# Patient Record
Sex: FEMALE | Race: WHITE | NOT HISPANIC OR LATINO | Employment: OTHER | URBAN - METROPOLITAN AREA
[De-identification: names, ages, dates, MRNs, and addresses within clinical notes are randomized per-mention and may not be internally consistent; named-entity substitution may affect disease eponyms.]

---

## 2018-08-28 PROBLEM — Z86.73 HISTORY OF CVA (CEREBROVASCULAR ACCIDENT): Status: ACTIVE | Noted: 2018-08-28

## 2018-08-28 PROBLEM — M47.816 LUMBAR ARTHROPATHY: Status: ACTIVE | Noted: 2018-08-28

## 2018-08-28 PROBLEM — E11.22 TYPE 2 DIABETES MELLITUS WITH CHRONIC KIDNEY DISEASE, WITHOUT LONG-TERM CURRENT USE OF INSULIN: Status: ACTIVE | Noted: 2018-08-28

## 2018-08-28 PROBLEM — I10 ESSENTIAL HYPERTENSION: Status: ACTIVE | Noted: 2018-08-28

## 2018-08-28 PROBLEM — J44.9 COPD (CHRONIC OBSTRUCTIVE PULMONARY DISEASE): Status: ACTIVE | Noted: 2018-08-28

## 2018-08-28 PROBLEM — Z87.891 HISTORY OF TOBACCO ABUSE: Status: ACTIVE | Noted: 2018-08-28

## 2018-08-29 PROBLEM — E55.9 VITAMIN D DEFICIENCY: Status: ACTIVE | Noted: 2018-08-29

## 2019-01-02 ENCOUNTER — TELEPHONE (OUTPATIENT)
Dept: ADMINISTRATIVE | Facility: HOSPITAL | Age: 69
End: 2019-01-02

## 2019-01-10 PROBLEM — N18.4 STAGE 4 CHRONIC KIDNEY DISEASE: Status: ACTIVE | Noted: 2019-01-10

## 2019-07-09 DIAGNOSIS — N18.6 ESRD (END STAGE RENAL DISEASE): Primary | ICD-10-CM

## 2019-07-09 RX ORDER — MUPIROCIN 20 MG/G
OINTMENT TOPICAL
Status: CANCELLED | OUTPATIENT
Start: 2019-07-09

## 2019-07-09 RX ORDER — LIDOCAINE HYDROCHLORIDE 10 MG/ML
1 INJECTION, SOLUTION EPIDURAL; INFILTRATION; INTRACAUDAL; PERINEURAL ONCE
Status: CANCELLED | OUTPATIENT
Start: 2019-07-09 | End: 2019-07-09

## 2019-07-09 RX ORDER — SODIUM CHLORIDE 0.9 % (FLUSH) 0.9 %
10 SYRINGE (ML) INJECTION
Status: DISCONTINUED | OUTPATIENT
Start: 2019-07-09 | End: 2019-07-09 | Stop reason: HOSPADM

## 2019-08-05 PROBLEM — N18.6 ESRD (END STAGE RENAL DISEASE): Status: ACTIVE | Noted: 2019-08-05

## 2019-10-31 ENCOUNTER — NURSE TRIAGE (OUTPATIENT)
Dept: ADMINISTRATIVE | Facility: CLINIC | Age: 69
End: 2019-10-31

## 2019-10-31 NOTE — TELEPHONE ENCOUNTER
Sx on Monday to have fistula discontiued, left arm is where she had the sx and is having hand swelling and shoulder pain. Hand is warm at this time. Advised to go to the ER to have hand evaluated. Verbalized understanding.     Reason for Disposition   Sounds like a serious complication to the triager    Additional Information   Negative: Sounds like a life-threatening emergency to the triager   Negative: [1] Widespread rash AND [2] bright red, sunburn-like    Protocols used: ST POST-OP SYMPTOMS AND [id number]

## 2019-11-18 RX ORDER — TRAMADOL HYDROCHLORIDE 50 MG/1
50 TABLET ORAL EVERY 6 HOURS PRN
Qty: 15 TABLET | Refills: 0 | Status: SHIPPED | OUTPATIENT
Start: 2019-11-18 | End: 2019-11-19 | Stop reason: SDUPTHER

## 2019-11-19 RX ORDER — TRAMADOL HYDROCHLORIDE 50 MG/1
50 TABLET ORAL EVERY 6 HOURS PRN
Qty: 15 TABLET | Refills: 0 | Status: SHIPPED | OUTPATIENT
Start: 2019-11-19 | End: 2020-06-23

## 2019-12-17 PROBLEM — I71.40 AAA (ABDOMINAL AORTIC ANEURYSM): Status: ACTIVE | Noted: 2019-12-17

## 2019-12-17 PROBLEM — E66.2 OBESITY HYPOVENTILATION SYNDROME: Status: ACTIVE | Noted: 2019-12-17

## 2019-12-17 PROBLEM — T82.898A STEAL SYNDROME AS COMPLICATION OF DIALYSIS ACCESS: Status: ACTIVE | Noted: 2019-12-17

## 2019-12-17 PROBLEM — G47.33 OSA (OBSTRUCTIVE SLEEP APNEA): Status: ACTIVE | Noted: 2019-12-17

## 2019-12-25 ENCOUNTER — LAB VISIT (OUTPATIENT)
Dept: LAB | Facility: HOSPITAL | Age: 69
End: 2019-12-25
Payer: MEDICAID

## 2019-12-25 DIAGNOSIS — Z12.9 SCREENING FOR CANCER: ICD-10-CM

## 2019-12-25 PROCEDURE — 82274 ASSAY TEST FOR BLOOD FECAL: CPT

## 2019-12-30 LAB — HEMOCCULT STL QL IA: NEGATIVE

## 2021-06-08 ENCOUNTER — PATIENT OUTREACH (OUTPATIENT)
Dept: ADMINISTRATIVE | Facility: HOSPITAL | Age: 71
End: 2021-06-08

## 2021-06-08 DIAGNOSIS — Z12.31 ENCOUNTER FOR SCREENING MAMMOGRAM FOR MALIGNANT NEOPLASM OF BREAST: Primary | ICD-10-CM

## 2021-07-20 ENCOUNTER — LAB VISIT (OUTPATIENT)
Dept: LAB | Facility: HOSPITAL | Age: 71
End: 2021-07-20
Attending: INTERNAL MEDICINE
Payer: MEDICAID

## 2021-07-20 DIAGNOSIS — Z12.11 COLON CANCER SCREENING: ICD-10-CM

## 2021-07-20 PROCEDURE — 82274 ASSAY TEST FOR BLOOD FECAL: CPT | Performed by: INTERNAL MEDICINE

## 2021-07-22 LAB — HEMOCCULT STL QL IA: POSITIVE

## 2022-04-22 ENCOUNTER — PATIENT OUTREACH (OUTPATIENT)
Dept: ADMINISTRATIVE | Facility: HOSPITAL | Age: 72
End: 2022-04-22
Payer: MEDICAID

## 2022-07-25 ENCOUNTER — PATIENT OUTREACH (OUTPATIENT)
Dept: ADMINISTRATIVE | Facility: HOSPITAL | Age: 72
End: 2022-07-25
Payer: MEDICAID

## 2022-07-25 NOTE — PROGRESS NOTES
Contact pt in reference to overdue DM eye exam and other preventive screenings. Pt declined. Pt states she will call me back after the 15th.

## 2022-08-17 DIAGNOSIS — Z12.31 ENCOUNTER FOR SCREENING MAMMOGRAM FOR BREAST CANCER: Primary | ICD-10-CM

## 2022-08-31 ENCOUNTER — PATIENT OUTREACH (OUTPATIENT)
Dept: INTERNAL MEDICINE | Facility: CLINIC | Age: 72
End: 2022-08-31
Payer: MEDICAID

## 2022-08-31 DIAGNOSIS — N18.4 TYPE 2 DIABETES MELLITUS WITH STAGE 4 CHRONIC KIDNEY DISEASE, WITHOUT LONG-TERM CURRENT USE OF INSULIN: Primary | ICD-10-CM

## 2022-08-31 DIAGNOSIS — E11.22 TYPE 2 DIABETES MELLITUS WITH STAGE 4 CHRONIC KIDNEY DISEASE, WITHOUT LONG-TERM CURRENT USE OF INSULIN: Primary | ICD-10-CM

## 2022-10-13 ENCOUNTER — PATIENT OUTREACH (OUTPATIENT)
Dept: ADMINISTRATIVE | Facility: HOSPITAL | Age: 72
End: 2022-10-13
Payer: MEDICAID

## 2022-10-13 DIAGNOSIS — I10 ESSENTIAL HYPERTENSION: Primary | ICD-10-CM

## 2022-10-20 PROBLEM — N18.4 STAGE 4 CHRONIC KIDNEY DISEASE: Status: RESOLVED | Noted: 2019-01-10 | Resolved: 2022-10-20

## 2022-10-20 PROBLEM — N18.6 ESRD (END STAGE RENAL DISEASE): Status: RESOLVED | Noted: 2019-08-05 | Resolved: 2022-10-20

## 2022-10-26 DIAGNOSIS — E11.9 TYPE 2 DIABETES MELLITUS WITHOUT COMPLICATION: ICD-10-CM

## 2023-01-13 PROBLEM — R91.8 MASS OF MIDDLE LOBE OF RIGHT LUNG: Status: ACTIVE | Noted: 2023-01-13

## 2023-02-24 PROBLEM — C34.90 SMALL CELL LUNG CANCER: Status: ACTIVE | Noted: 2023-02-24

## 2023-02-27 PROBLEM — R91.8 MASS OF MIDDLE LOBE OF RIGHT LUNG: Status: RESOLVED | Noted: 2023-01-13 | Resolved: 2023-02-27

## 2023-04-21 PROBLEM — N18.9 ACUTE KIDNEY INJURY SUPERIMPOSED ON CKD: Status: ACTIVE | Noted: 2023-04-21

## 2023-04-21 PROBLEM — R53.1 GENERALIZED WEAKNESS: Status: ACTIVE | Noted: 2023-04-21

## 2023-04-21 PROBLEM — R82.71 ASYMPTOMATIC BACTERIURIA: Status: ACTIVE | Noted: 2023-04-21

## 2023-04-21 PROBLEM — N30.00 ACUTE CYSTITIS WITHOUT HEMATURIA: Status: RESOLVED | Noted: 2023-04-21 | Resolved: 2023-04-21

## 2023-04-21 PROBLEM — N17.9 AKI (ACUTE KIDNEY INJURY): Status: ACTIVE | Noted: 2023-04-21

## 2023-04-21 PROBLEM — D61.818 PANCYTOPENIA: Status: ACTIVE | Noted: 2023-04-21

## 2023-04-21 PROBLEM — R39.89 SUSPECTED UTI: Status: ACTIVE | Noted: 2023-04-21

## 2023-04-21 PROBLEM — N30.00 ACUTE CYSTITIS WITHOUT HEMATURIA: Status: ACTIVE | Noted: 2023-04-21

## 2023-04-21 PROBLEM — E87.29 HIGH ANION GAP METABOLIC ACIDOSIS: Status: ACTIVE | Noted: 2023-04-21

## 2023-04-23 PROBLEM — T82.9XXA VASCULAR PORT COMPLICATION: Status: ACTIVE | Noted: 2023-04-23

## 2023-04-23 PROBLEM — A49.8 CLOSTRIDIUM DIFFICILE INFECTION: Status: ACTIVE | Noted: 2023-04-23

## 2023-04-24 PROBLEM — B37.2 CANDIDOSIS OF SKIN: Status: ACTIVE | Noted: 2023-04-24

## 2023-04-25 PROBLEM — K92.2 GI BLEED: Status: ACTIVE | Noted: 2023-04-25

## 2023-05-05 ENCOUNTER — PATIENT OUTREACH (OUTPATIENT)
Dept: ADMINISTRATIVE | Facility: CLINIC | Age: 73
End: 2023-05-05
Payer: MEDICAID

## 2023-05-14 ENCOUNTER — NURSE TRIAGE (OUTPATIENT)
Dept: ADMINISTRATIVE | Facility: CLINIC | Age: 73
End: 2023-05-14
Payer: MEDICAID

## 2023-05-14 NOTE — TELEPHONE ENCOUNTER
Pt with a blood pressure of 142/90 at 2:15am after pt walked to the restroom.  Pt with no symptoms.  Pt is currently 139/65 during our triage call.  Care advice states to see a provider within 2 weeks, pts fly states she has 2 upcoming appointments.  Family/pt verbally understood, all questions answered, advised to call back for any worsening symptoms or further needs.    Reason for Disposition   [1] Systolic BP  >= 130 OR Diastolic >= 80 AND [2] taking BP medications    Additional Information   Negative: Difficult to awaken or acting confused (e.g., disoriented, slurred speech)   Negative: SEVERE difficulty breathing (e.g., struggling for each breath, speaks in single words)   Negative: [1] Weakness of the face, arm or leg on one side of the body AND [2] new-onset   Negative: [1] Numbness (i.e., loss of sensation) of the face, arm or leg on one side of the body AND [2] new-onset   Negative: [1] Chest pain lasts > 5 minutes AND [2] history of heart disease (i.e., heart attack, bypass surgery, angina, angioplasty, CHF)   Negative: [1] Chest pain AND [2] took nitrogylcerin AND [3] pain was not relieved   Negative: Sounds like a life-threatening emergency to the triager   Negative: Symptom is main concern (e.g., headache, chest pain)   Negative: [1] Systolic BP  >= 160 OR Diastolic >= 100 AND [2] cardiac (e.g., breathing difficulty, chest pain) or neurologic symptoms (e.g., new-onset blurred or double vision, unsteady gait)   Negative: [1] Pregnant 20 or more weeks (or postpartum < 6 weeks) AND [2] new hand or face swelling   Negative: [1] Pregnant 20 or more weeks (or postpartum < 6 weeks) AND [2] Systolic BP >= 160 OR Diastolic >= 110   Negative: [1] Systolic BP  >= 200 OR Diastolic >= 120 AND [2] having NO cardiac or neurologic symptoms   Negative: [1] Pregnant 20 or more weeks (or postpartum < 6 weeks) AND [2] Systolic BP  >= 140 OR Diastolic >= 90   Negative: [1] Systolic BP  >= 180 OR Diastolic >= 110 AND [2]  missed most recent dose of blood pressure medication   Negative: Systolic BP  >= 180 OR Diastolic >= 110   Negative: Systolic BP  >= 160 OR Diastolic >= 100   Negative: [1] Taking BP medications AND [2] feels is having side effects (e.g., impotence, cough, dizzy upon standing)   Negative: [1] Systolic BP  >= 130 OR Diastolic >= 80 AND [2] pregnant    Protocols used: Blood Pressure - High-A-AH

## 2023-05-15 PROBLEM — J44.9 COPD (CHRONIC OBSTRUCTIVE PULMONARY DISEASE): Status: RESOLVED | Noted: 2018-08-28 | Resolved: 2023-05-15

## 2023-05-15 NOTE — TELEPHONE ENCOUNTER
Attempted to contact pt, spoke with pt's grandchild (Pricila-listed as contact), verified pt's name and . She reports that that they are on their way to Nephrology and Pulmonary appt. Stated will discuss issues at scheduled appts today and will call back if need to schedule an appt.

## 2023-05-18 ENCOUNTER — PATIENT OUTREACH (OUTPATIENT)
Dept: ADMINISTRATIVE | Facility: HOSPITAL | Age: 73
End: 2023-05-18
Payer: MEDICAID

## 2023-05-28 ENCOUNTER — HOSPITAL ENCOUNTER (OUTPATIENT)
Facility: HOSPITAL | Age: 73
Discharge: HOME | End: 2023-05-29
Attending: EMERGENCY MEDICINE | Admitting: EMERGENCY MEDICINE
Payer: MEDICAID

## 2023-05-28 DIAGNOSIS — R55 SYNCOPE: Primary | ICD-10-CM

## 2023-05-28 DIAGNOSIS — R07.9 CHEST PAIN: ICD-10-CM

## 2023-05-28 DIAGNOSIS — K92.1 HEMATOCHEZIA: ICD-10-CM

## 2023-05-28 DIAGNOSIS — B37.2 CANDIDOSIS OF SKIN: ICD-10-CM

## 2023-05-28 DIAGNOSIS — R06.82 TACHYPNEA: ICD-10-CM

## 2023-05-28 LAB
ALBUMIN SERPL BCP-MCNC: 3.4 G/DL (ref 3.5–5.2)
ALP SERPL-CCNC: 123 U/L (ref 55–135)
ALT SERPL W/O P-5'-P-CCNC: 18 U/L (ref 10–44)
ANION GAP SERPL CALC-SCNC: 13 MMOL/L (ref 8–16)
AST SERPL-CCNC: 30 U/L (ref 10–40)
BACTERIA #/AREA URNS AUTO: ABNORMAL /HPF
BASOPHILS # BLD AUTO: 0.07 K/UL (ref 0–0.2)
BASOPHILS NFR BLD: 0.6 % (ref 0–1.9)
BILIRUB SERPL-MCNC: 0.6 MG/DL (ref 0.1–1)
BILIRUB UR QL STRIP: ABNORMAL
BNP SERPL-MCNC: 137 PG/ML (ref 0–99)
BUN SERPL-MCNC: 20 MG/DL (ref 8–23)
CALCIUM SERPL-MCNC: 9.4 MG/DL (ref 8.7–10.5)
CHLORIDE SERPL-SCNC: 105 MMOL/L (ref 95–110)
CLARITY UR REFRACT.AUTO: ABNORMAL
CO2 SERPL-SCNC: 23 MMOL/L (ref 23–29)
COLOR UR AUTO: YELLOW
CREAT SERPL-MCNC: 1.3 MG/DL (ref 0.5–1.4)
DIFFERENTIAL METHOD: ABNORMAL
EOSINOPHIL # BLD AUTO: 0.1 K/UL (ref 0–0.5)
EOSINOPHIL NFR BLD: 0.7 % (ref 0–8)
ERYTHROCYTE [DISTWIDTH] IN BLOOD BY AUTOMATED COUNT: 20.7 % (ref 11.5–14.5)
EST. GFR  (NO RACE VARIABLE): 44 ML/MIN/1.73 M^2
GLUCOSE SERPL-MCNC: 169 MG/DL (ref 70–110)
GLUCOSE UR QL STRIP: ABNORMAL
GRAN CASTS UR QL COMP ASSIST: 9 /LPF
HCT VFR BLD AUTO: 37.6 % (ref 37–48.5)
HGB BLD-MCNC: 12.1 G/DL (ref 12–16)
HGB UR QL STRIP: NEGATIVE
HYALINE CASTS UR QL AUTO: 11 /LPF
IMM GRANULOCYTES # BLD AUTO: 0.06 K/UL (ref 0–0.04)
IMM GRANULOCYTES NFR BLD AUTO: 0.5 % (ref 0–0.5)
KETONES UR QL STRIP: ABNORMAL
LEUKOCYTE ESTERASE UR QL STRIP: NEGATIVE
LYMPHOCYTES # BLD AUTO: 0.7 K/UL (ref 1–4.8)
LYMPHOCYTES NFR BLD: 5.8 % (ref 18–48)
MAGNESIUM SERPL-MCNC: 1.6 MG/DL (ref 1.6–2.6)
MCH RBC QN AUTO: 33 PG (ref 27–31)
MCHC RBC AUTO-ENTMCNC: 32.2 G/DL (ref 32–36)
MCV RBC AUTO: 103 FL (ref 82–98)
MICROSCOPIC COMMENT: ABNORMAL
MONOCYTES # BLD AUTO: 0.5 K/UL (ref 0.3–1)
MONOCYTES NFR BLD: 3.8 % (ref 4–15)
NEUTROPHILS # BLD AUTO: 10.9 K/UL (ref 1.8–7.7)
NEUTROPHILS NFR BLD: 88.6 % (ref 38–73)
NITRITE UR QL STRIP: NEGATIVE
NRBC BLD-RTO: 0 /100 WBC
PH UR STRIP: 6 [PH] (ref 5–8)
PLATELET # BLD AUTO: 147 K/UL (ref 150–450)
PMV BLD AUTO: 10.1 FL (ref 9.2–12.9)
POTASSIUM SERPL-SCNC: 4.6 MMOL/L (ref 3.5–5.1)
PROT SERPL-MCNC: 7 G/DL (ref 6–8.4)
PROT UR QL STRIP: ABNORMAL
RBC # BLD AUTO: 3.67 M/UL (ref 4–5.4)
RBC #/AREA URNS AUTO: 1 /HPF (ref 0–4)
SODIUM SERPL-SCNC: 141 MMOL/L (ref 136–145)
SP GR UR STRIP: 1.03 (ref 1–1.03)
SQUAMOUS #/AREA URNS AUTO: 1 /HPF
TROPONIN I SERPL DL<=0.01 NG/ML-MCNC: 0.01 NG/ML (ref 0–0.03)
URN SPEC COLLECT METH UR: ABNORMAL
WBC # BLD AUTO: 12.35 K/UL (ref 3.9–12.7)
WBC #/AREA URNS AUTO: 2 /HPF (ref 0–5)

## 2023-05-28 PROCEDURE — 81001 URINALYSIS AUTO W/SCOPE: CPT

## 2023-05-28 PROCEDURE — 83735 ASSAY OF MAGNESIUM: CPT

## 2023-05-28 PROCEDURE — G0378 HOSPITAL OBSERVATION PER HR: HCPCS

## 2023-05-28 PROCEDURE — 99223 1ST HOSP IP/OBS HIGH 75: CPT | Mod: ,,, | Performed by: NURSE PRACTITIONER

## 2023-05-28 PROCEDURE — 83880 ASSAY OF NATRIURETIC PEPTIDE: CPT

## 2023-05-28 PROCEDURE — 93005 ELECTROCARDIOGRAM TRACING: CPT

## 2023-05-28 PROCEDURE — 85025 COMPLETE CBC W/AUTO DIFF WBC: CPT

## 2023-05-28 PROCEDURE — 84484 ASSAY OF TROPONIN QUANT: CPT

## 2023-05-28 PROCEDURE — 80053 COMPREHEN METABOLIC PANEL: CPT

## 2023-05-28 PROCEDURE — 99223 PR INITIAL HOSPITAL CARE,LEVL III: ICD-10-PCS | Mod: ,,, | Performed by: NURSE PRACTITIONER

## 2023-05-28 PROCEDURE — 99285 PR EMERGENCY DEPT VISIT,LEVEL V: ICD-10-PCS | Mod: GC,,, | Performed by: EMERGENCY MEDICINE

## 2023-05-28 PROCEDURE — 99285 EMERGENCY DEPT VISIT HI MDM: CPT | Mod: GC,,, | Performed by: EMERGENCY MEDICINE

## 2023-05-28 PROCEDURE — 93010 ELECTROCARDIOGRAM REPORT: CPT | Mod: ,,, | Performed by: INTERNAL MEDICINE

## 2023-05-28 PROCEDURE — 25000003 PHARM REV CODE 250: Performed by: NURSE PRACTITIONER

## 2023-05-28 PROCEDURE — 99285 EMERGENCY DEPT VISIT HI MDM: CPT | Mod: 25

## 2023-05-28 PROCEDURE — 93010 EKG 12-LEAD: ICD-10-PCS | Mod: ,,, | Performed by: INTERNAL MEDICINE

## 2023-05-28 RX ORDER — LANOLIN ALCOHOL/MO/W.PET/CERES
400 CREAM (GRAM) TOPICAL DAILY
Status: DISCONTINUED | OUTPATIENT
Start: 2023-05-29 | End: 2023-05-29 | Stop reason: HOSPADM

## 2023-05-28 RX ORDER — ALBUTEROL SULFATE 90 UG/1
2 AEROSOL, METERED RESPIRATORY (INHALATION) EVERY 6 HOURS PRN
Status: DISCONTINUED | OUTPATIENT
Start: 2023-05-29 | End: 2023-05-29 | Stop reason: HOSPADM

## 2023-05-28 RX ORDER — INSULIN ASPART 100 [IU]/ML
0-5 INJECTION, SOLUTION INTRAVENOUS; SUBCUTANEOUS
Status: DISCONTINUED | OUTPATIENT
Start: 2023-05-28 | End: 2023-05-29 | Stop reason: HOSPADM

## 2023-05-28 RX ORDER — ATORVASTATIN CALCIUM 40 MG/1
80 TABLET, FILM COATED ORAL NIGHTLY
Status: DISCONTINUED | OUTPATIENT
Start: 2023-05-28 | End: 2023-05-29 | Stop reason: HOSPADM

## 2023-05-28 RX ORDER — DEXTROSE 40 %
15 GEL (GRAM) ORAL
Status: DISCONTINUED | OUTPATIENT
Start: 2023-05-28 | End: 2023-05-29 | Stop reason: HOSPADM

## 2023-05-28 RX ORDER — ACETAMINOPHEN 325 MG/1
650 TABLET ORAL EVERY 6 HOURS PRN
Status: DISCONTINUED | OUTPATIENT
Start: 2023-05-28 | End: 2023-05-29 | Stop reason: HOSPADM

## 2023-05-28 RX ORDER — TALC
6 POWDER (GRAM) TOPICAL NIGHTLY PRN
Status: DISCONTINUED | OUTPATIENT
Start: 2023-05-28 | End: 2023-05-29 | Stop reason: HOSPADM

## 2023-05-28 RX ORDER — DEXTROSE 40 %
30 GEL (GRAM) ORAL
Status: DISCONTINUED | OUTPATIENT
Start: 2023-05-28 | End: 2023-05-29 | Stop reason: HOSPADM

## 2023-05-28 RX ORDER — HYDROCODONE BITARTRATE AND ACETAMINOPHEN 7.5; 325 MG/1; MG/1
1 TABLET ORAL 2 TIMES DAILY PRN
Status: DISCONTINUED | OUTPATIENT
Start: 2023-05-28 | End: 2023-05-29 | Stop reason: HOSPADM

## 2023-05-28 RX ORDER — NAPROXEN SODIUM 220 MG/1
81 TABLET, FILM COATED ORAL DAILY
Status: DISCONTINUED | OUTPATIENT
Start: 2023-05-29 | End: 2023-05-29 | Stop reason: HOSPADM

## 2023-05-28 RX ORDER — CLOPIDOGREL BISULFATE 75 MG/1
75 TABLET ORAL DAILY
Status: DISCONTINUED | OUTPATIENT
Start: 2023-05-29 | End: 2023-05-29 | Stop reason: HOSPADM

## 2023-05-28 RX ORDER — SODIUM CHLORIDE 0.9 % (FLUSH) 0.9 %
10 SYRINGE (ML) INJECTION EVERY 12 HOURS PRN
Status: DISCONTINUED | OUTPATIENT
Start: 2023-05-28 | End: 2023-05-29 | Stop reason: HOSPADM

## 2023-05-28 RX ORDER — NALOXONE HCL 0.4 MG/ML
0.02 VIAL (ML) INJECTION
Status: DISCONTINUED | OUTPATIENT
Start: 2023-05-28 | End: 2023-05-29 | Stop reason: HOSPADM

## 2023-05-28 RX ORDER — DULOXETIN HYDROCHLORIDE 20 MG/1
20 CAPSULE, DELAYED RELEASE ORAL DAILY
Status: DISCONTINUED | OUTPATIENT
Start: 2023-05-29 | End: 2023-05-29 | Stop reason: HOSPADM

## 2023-05-28 RX ORDER — GLUCAGON 1 MG
1 KIT INJECTION
Status: DISCONTINUED | OUTPATIENT
Start: 2023-05-28 | End: 2023-05-29 | Stop reason: HOSPADM

## 2023-05-28 RX ORDER — MAGNESIUM SULFATE 1 G/100ML
1 INJECTION INTRAVENOUS ONCE
Status: COMPLETED | OUTPATIENT
Start: 2023-05-28 | End: 2023-05-29

## 2023-05-28 RX ORDER — ALLOPURINOL 300 MG/1
300 TABLET ORAL DAILY
Status: DISCONTINUED | OUTPATIENT
Start: 2023-05-29 | End: 2023-05-29 | Stop reason: HOSPADM

## 2023-05-28 RX ORDER — AMOXICILLIN 250 MG
1 CAPSULE ORAL 2 TIMES DAILY PRN
Status: DISCONTINUED | OUTPATIENT
Start: 2023-05-28 | End: 2023-05-29 | Stop reason: HOSPADM

## 2023-05-28 RX ADMIN — ATORVASTATIN CALCIUM 80 MG: 40 TABLET, FILM COATED ORAL at 10:05

## 2023-05-28 NOTE — ED TRIAGE NOTES
"Majo Diego, a 72 y.o. female presents to the ED w/ complaint of syncope. Pt granddaughter states that pt was having a bowel movement and vagaled down while using the restroom. Pt hadn't had a bowel movement since last week Monday. Pt loss consciousness for about 1 minute.  Pt also had an episode of bloody stool. Pt also complains of abdominal pain along with nausea and vomiting. Pt denied headache, shortness of breath or chest pain.     Triage note:  Chief Complaint   Patient presents with    Loss of Consciousness     EMS reports that pt had syncopal episode on toilet today. Reports bloody stools w/ bright red blood. Aox2, oriented to person and place. Hx lung cx.      Review of patient's allergies indicates:  No Known Allergies  Past Medical History:   Diagnosis Date    AAA (abdominal aortic aneurysm)     Arthritis     Back pain     BMI 45.0-49.9, adult 12/17/2019    Cancer     CKD (chronic kidney disease)     COPD (chronic obstructive pulmonary disease)     CVA (cerebral vascular accident)     right sided weakness    Diabetes mellitus     Gallbladder & bile duct stone with obstruction     Heart defect     "hole in heart"    Hypertension     Obesity hypoventilation syndrome 12/17/2019    Restrictive lung disease     Steal syndrome as complication of dialysis access 12/17/2019      "

## 2023-05-28 NOTE — PROVIDER PROGRESS NOTES - EMERGENCY DEPT.
Encounter Date: 5/28/2023    ED Physician Progress Notes            EKG interpretation by ED attending physician:  NSR, rate 73, no ST changes, no ischemia, normal intervals.  Compared with prior EKG dated 05/2023, T-wave changes in inferior leads have improved.

## 2023-05-28 NOTE — ED NOTES
Granddaughter Pricila left to go home and collect some personal items. She asked to called if any updates or changes happen before she arrives back at [phone].

## 2023-05-29 VITALS
OXYGEN SATURATION: 95 % | SYSTOLIC BLOOD PRESSURE: 145 MMHG | RESPIRATION RATE: 18 BRPM | HEIGHT: 61 IN | TEMPERATURE: 98 F | HEART RATE: 75 BPM | DIASTOLIC BLOOD PRESSURE: 67 MMHG | WEIGHT: 196 LBS | BODY MASS INDEX: 37 KG/M2

## 2023-05-29 LAB
ALBUMIN SERPL BCP-MCNC: 2.9 G/DL (ref 3.5–5.2)
ALP SERPL-CCNC: 102 U/L (ref 55–135)
ALT SERPL W/O P-5'-P-CCNC: 14 U/L (ref 10–44)
ANION GAP SERPL CALC-SCNC: 11 MMOL/L (ref 8–16)
ASCENDING AORTA: 3.41 CM
AST SERPL-CCNC: 18 U/L (ref 10–40)
AV INDEX (PROSTH): 0.55
AV MEAN GRADIENT: 6 MMHG
AV PEAK GRADIENT: 14 MMHG
AV VALVE AREA: 2.07 CM2
AV VELOCITY RATIO: 0.36
BASOPHILS # BLD AUTO: 0.05 K/UL (ref 0–0.2)
BASOPHILS NFR BLD: 0.5 % (ref 0–1.9)
BILIRUB SERPL-MCNC: 0.7 MG/DL (ref 0.1–1)
BSA FOR ECHO PROCEDURE: 1.96 M2
BUN SERPL-MCNC: 21 MG/DL (ref 8–23)
CALCIUM SERPL-MCNC: 9.1 MG/DL (ref 8.7–10.5)
CHLORIDE SERPL-SCNC: 106 MMOL/L (ref 95–110)
CO2 SERPL-SCNC: 24 MMOL/L (ref 23–29)
CREAT SERPL-MCNC: 1.2 MG/DL (ref 0.5–1.4)
CV ECHO LV RWT: 0.33 CM
DIFFERENTIAL METHOD: ABNORMAL
DOP CALC AO PEAK VEL: 1.87 M/S
DOP CALC AO VTI: 35.91 CM
DOP CALC LVOT AREA: 3.8 CM2
DOP CALC LVOT DIAMETER: 2.19 CM
DOP CALC LVOT PEAK VEL: 0.67 M/S
DOP CALC LVOT STROKE VOLUME: 74.28 CM3
DOP CALCLVOT PEAK VEL VTI: 19.73 CM
E WAVE DECELERATION TIME: 115.7 MSEC
E/A RATIO: 1.15
E/E' RATIO: 15.38 M/S
ECHO LV POSTERIOR WALL: 0.77 CM (ref 0.6–1.1)
EJECTION FRACTION: 50 %
EOSINOPHIL # BLD AUTO: 0.3 K/UL (ref 0–0.5)
EOSINOPHIL NFR BLD: 2.7 % (ref 0–8)
ERYTHROCYTE [DISTWIDTH] IN BLOOD BY AUTOMATED COUNT: 20.7 % (ref 11.5–14.5)
EST. GFR  (NO RACE VARIABLE): 48.4 ML/MIN/1.73 M^2
FRACTIONAL SHORTENING: 23 % (ref 28–44)
GLUCOSE SERPL-MCNC: 101 MG/DL (ref 70–110)
HCT VFR BLD AUTO: 33.7 % (ref 37–48.5)
HGB BLD-MCNC: 10.7 G/DL (ref 12–16)
IMM GRANULOCYTES # BLD AUTO: 0.03 K/UL (ref 0–0.04)
IMM GRANULOCYTES NFR BLD AUTO: 0.3 % (ref 0–0.5)
INTERVENTRICULAR SEPTUM: 0.78 CM (ref 0.6–1.1)
IVRT: 131.3 MSEC
LA MAJOR: 5.88 CM
LA MINOR: 5.79 CM
LA WIDTH: 3.88 CM
LEFT ATRIUM SIZE: 2.68 CM
LEFT ATRIUM VOLUME INDEX MOD: 35.8 ML/M2
LEFT ATRIUM VOLUME INDEX: 27.6 ML/M2
LEFT ATRIUM VOLUME MOD: 67 CM3
LEFT ATRIUM VOLUME: 51.57 CM3
LEFT INTERNAL DIMENSION IN SYSTOLE: 3.6 CM (ref 2.1–4)
LEFT VENTRICLE DIASTOLIC VOLUME INDEX: 53.4 ML/M2
LEFT VENTRICLE DIASTOLIC VOLUME: 99.85 ML
LEFT VENTRICLE MASS INDEX: 62 G/M2
LEFT VENTRICLE SYSTOLIC VOLUME INDEX: 29.1 ML/M2
LEFT VENTRICLE SYSTOLIC VOLUME: 54.36 ML
LEFT VENTRICULAR INTERNAL DIMENSION IN DIASTOLE: 4.65 CM (ref 3.5–6)
LEFT VENTRICULAR MASS: 115.24 G
LV LATERAL E/E' RATIO: 14.29 M/S
LV SEPTAL E/E' RATIO: 16.67 M/S
LYMPHOCYTES # BLD AUTO: 1.8 K/UL (ref 1–4.8)
LYMPHOCYTES NFR BLD: 17.9 % (ref 18–48)
MAGNESIUM SERPL-MCNC: 1.9 MG/DL (ref 1.6–2.6)
MCH RBC QN AUTO: 32.7 PG (ref 27–31)
MCHC RBC AUTO-ENTMCNC: 31.8 G/DL (ref 32–36)
MCV RBC AUTO: 103 FL (ref 82–98)
MONOCYTES # BLD AUTO: 0.6 K/UL (ref 0.3–1)
MONOCYTES NFR BLD: 6.4 % (ref 4–15)
MV PEAK A VEL: 0.87 M/S
MV PEAK E VEL: 1 M/S
MV STENOSIS PRESSURE HALF TIME: 33.55 MS
MV VALVE AREA P 1/2 METHOD: 6.56 CM2
NEUTROPHILS # BLD AUTO: 7.1 K/UL (ref 1.8–7.7)
NEUTROPHILS NFR BLD: 72.2 % (ref 38–73)
NRBC BLD-RTO: 0 /100 WBC
PLATELET # BLD AUTO: 147 K/UL (ref 150–450)
PMV BLD AUTO: 9.9 FL (ref 9.2–12.9)
POCT GLUCOSE: 118 MG/DL (ref 70–110)
POCT GLUCOSE: 131 MG/DL (ref 70–110)
POCT GLUCOSE: 178 MG/DL (ref 70–110)
POTASSIUM SERPL-SCNC: 3.8 MMOL/L (ref 3.5–5.1)
PROT SERPL-MCNC: 5.8 G/DL (ref 6–8.4)
RA MAJOR: 5.35 CM
RA PRESSURE: 3 MMHG
RA WIDTH: 3.67 CM
RBC # BLD AUTO: 3.27 M/UL (ref 4–5.4)
RIGHT VENTRICULAR END-DIASTOLIC DIMENSION: 3.27 CM
SINUS: 3.4 CM
SODIUM SERPL-SCNC: 141 MMOL/L (ref 136–145)
STJ: 2.6 CM
TDI LATERAL: 0.07 M/S
TDI SEPTAL: 0.06 M/S
TDI: 0.07 M/S
TRICUSPID ANNULAR PLANE SYSTOLIC EXCURSION: 2.67 CM
TSH SERPL DL<=0.005 MIU/L-ACNC: 1.94 UIU/ML (ref 0.4–4)
WBC # BLD AUTO: 9.81 K/UL (ref 3.9–12.7)

## 2023-05-29 PROCEDURE — 84443 ASSAY THYROID STIM HORMONE: CPT | Performed by: NURSE PRACTITIONER

## 2023-05-29 PROCEDURE — 25000003 PHARM REV CODE 250: Performed by: NURSE PRACTITIONER

## 2023-05-29 PROCEDURE — 83735 ASSAY OF MAGNESIUM: CPT | Performed by: NURSE PRACTITIONER

## 2023-05-29 PROCEDURE — 80053 COMPREHEN METABOLIC PANEL: CPT | Performed by: NURSE PRACTITIONER

## 2023-05-29 PROCEDURE — 85025 COMPLETE CBC W/AUTO DIFF WBC: CPT | Performed by: NURSE PRACTITIONER

## 2023-05-29 PROCEDURE — 94761 N-INVAS EAR/PLS OXIMETRY MLT: CPT

## 2023-05-29 PROCEDURE — 99239 PR HOSPITAL DISCHARGE DAY,>30 MIN: ICD-10-PCS | Mod: ,,, | Performed by: STUDENT IN AN ORGANIZED HEALTH CARE EDUCATION/TRAINING PROGRAM

## 2023-05-29 PROCEDURE — 99239 HOSP IP/OBS DSCHRG MGMT >30: CPT | Mod: ,,, | Performed by: STUDENT IN AN ORGANIZED HEALTH CARE EDUCATION/TRAINING PROGRAM

## 2023-05-29 PROCEDURE — 25000003 PHARM REV CODE 250: Performed by: STUDENT IN AN ORGANIZED HEALTH CARE EDUCATION/TRAINING PROGRAM

## 2023-05-29 PROCEDURE — G0378 HOSPITAL OBSERVATION PER HR: HCPCS

## 2023-05-29 PROCEDURE — 63600175 PHARM REV CODE 636 W HCPCS: Performed by: NURSE PRACTITIONER

## 2023-05-29 PROCEDURE — 36415 COLL VENOUS BLD VENIPUNCTURE: CPT | Performed by: NURSE PRACTITIONER

## 2023-05-29 PROCEDURE — 96365 THER/PROPH/DIAG IV INF INIT: CPT

## 2023-05-29 PROCEDURE — 94799 UNLISTED PULMONARY SVC/PX: CPT

## 2023-05-29 RX ORDER — AMOXICILLIN 250 MG
1 CAPSULE ORAL 2 TIMES DAILY PRN
Qty: 30 TABLET | Refills: 1 | Status: SHIPPED | OUTPATIENT
Start: 2023-05-29 | End: 2023-07-24 | Stop reason: SDUPTHER

## 2023-05-29 RX ORDER — LISINOPRIL 2.5 MG/1
5 TABLET ORAL DAILY
Status: DISCONTINUED | OUTPATIENT
Start: 2023-05-29 | End: 2023-05-29 | Stop reason: HOSPADM

## 2023-05-29 RX ORDER — LISINOPRIL 5 MG/1
5 TABLET ORAL DAILY
Qty: 30 TABLET | Refills: 1 | Status: SHIPPED | OUTPATIENT
Start: 2023-05-30 | End: 2023-07-24 | Stop reason: SDUPTHER

## 2023-05-29 RX ORDER — PANTOPRAZOLE SODIUM 40 MG/1
40 TABLET, DELAYED RELEASE ORAL DAILY
Status: DISCONTINUED | OUTPATIENT
Start: 2023-05-29 | End: 2023-05-29 | Stop reason: HOSPADM

## 2023-05-29 RX ORDER — ENOXAPARIN SODIUM 100 MG/ML
40 INJECTION SUBCUTANEOUS EVERY 24 HOURS
Status: DISCONTINUED | OUTPATIENT
Start: 2023-05-29 | End: 2023-05-29 | Stop reason: HOSPADM

## 2023-05-29 RX ORDER — SODIUM CHLORIDE 9 MG/ML
INJECTION, SOLUTION INTRAVENOUS CONTINUOUS
Status: ACTIVE | OUTPATIENT
Start: 2023-05-29 | End: 2023-05-29

## 2023-05-29 RX ORDER — LACTULOSE 10 G/15ML
30 SOLUTION ORAL
Status: DISPENSED | OUTPATIENT
Start: 2023-05-29 | End: 2023-05-29

## 2023-05-29 RX ORDER — LACTULOSE 10 G/15ML
30 SOLUTION ORAL; RECTAL EVERY 8 HOURS PRN
Qty: 1500 ML | Refills: 1 | Status: SHIPPED | OUTPATIENT
Start: 2023-05-29

## 2023-05-29 RX ADMIN — CLOPIDOGREL BISULFATE 75 MG: 75 TABLET ORAL at 10:05

## 2023-05-29 RX ADMIN — PANTOPRAZOLE SODIUM 40 MG: 40 TABLET, DELAYED RELEASE ORAL at 10:05

## 2023-05-29 RX ADMIN — Medication 400 MG: at 10:05

## 2023-05-29 RX ADMIN — LACTULOSE 30 G: 20 SOLUTION ORAL at 10:05

## 2023-05-29 RX ADMIN — LISINOPRIL 5 MG: 2.5 TABLET ORAL at 10:05

## 2023-05-29 RX ADMIN — ALLOPURINOL 300 MG: 300 TABLET ORAL at 10:05

## 2023-05-29 RX ADMIN — LACTULOSE 30 G: 20 SOLUTION ORAL at 12:05

## 2023-05-29 RX ADMIN — DULOXETINE 20 MG: 20 CAPSULE, DELAYED RELEASE ORAL at 10:05

## 2023-05-29 RX ADMIN — SODIUM CHLORIDE: 9 INJECTION, SOLUTION INTRAVENOUS at 10:05

## 2023-05-29 RX ADMIN — MAGNESIUM SULFATE HEPTAHYDRATE 1 G: 500 INJECTION, SOLUTION INTRAMUSCULAR; INTRAVENOUS at 12:05

## 2023-05-29 RX ADMIN — ASPIRIN 81 MG 81 MG: 81 TABLET ORAL at 10:05

## 2023-05-29 NOTE — ED NOTES
Resumed care of patient, patient came into the ED due to multiple complaints. Had a syncope episode earlier today when she was using the bathroom. Also reported having bloody stools. Family at bedside, patient is alert x 2. Pain level is stated to be at 3 out of 10 at the moment. Will continue to monitor.

## 2023-05-29 NOTE — PROGRESS NOTES
"[facility]  Wound Care    Patient Name:  Majo Diego   MRN:  [id number]  Date: 2023  Diagnosis: Syncope    History:     Past Medical History:   Diagnosis Date    AAA (abdominal aortic aneurysm)     Arthritis     Back pain     BMI 45.0-49.9, adult 2019    Cancer     CKD (chronic kidney disease)     COPD (chronic obstructive pulmonary disease)     CVA (cerebral vascular accident)     right sided weakness    Diabetes mellitus     Gallbladder & bile duct stone with obstruction     Heart defect     "hole in heart"    Hypertension     Obesity hypoventilation syndrome 2019    Restrictive lung disease     Steal syndrome as complication of dialysis access 2019       Social History     Socioeconomic History    Marital status:    Tobacco Use    Smoking status: Former     Packs/day: 2.00     Years: 56.00     Pack years: 112.00     Types: Cigarettes     Start date: 1/10/1967     Quit date: 1/10/2017     Years since quittin.3    Smokeless tobacco: Never   Substance and Sexual Activity    Alcohol use: No    Drug use: No    Sexual activity: Not Currently     Social Determinants of Health     Financial Resource Strain: Low Risk     Difficulty of Paying Living Expenses: Not very hard   Transportation Needs: No Transportation Needs    Lack of Transportation (Medical): No    Lack of Transportation (Non-Medical): No   Physical Activity: Inactive    Days of Exercise per Week: 0 days    Minutes of Exercise per Session: 0 min   Housing Stability: Low Risk     Unable to Pay for Housing in the Last Year: No    Number of Places Lived in the Last Year: 1    Unstable Housing in the Last Year: No       Precautions:     Allergies as of 2023    (No Known Allergies)       WOC Assessment Details/Treatment   Patient seen for wound care consultation.   Reviewed chart for this encounter.   See Flow Sheet for findings.    Pt lying in bed intubated with family at the bedside. WOC Nurse cleansed " groin area with Normal Saline then applied Triad to the groin. Triad was used for a moisture barrier. Pt tolerated treatment well. Immerse mattress in use with proper settings applied. WOC Nurse continue to follow up.     RECOMMENDATIONS  Recommendations made to primary team for above plan via secured chat. WOC to follow up Orders placed.     Discussed POC with patient and primary RN.   See EMR for orders & patient education.  Discussed POC with primary team.    Nursing to continue care.  Nursing to maintain pressure injury prevention interventions.  Contact wound care for any further questions.     05/29/23 1604        Altered Skin Integrity 04/21/23 0135 anterior Groin #2 Moisture associated dermatitis Partial thickness tissue loss. Shallow open ulcer with a red or pink wound bed, without slough. Intact or Open/Ruptured Serum-filled blister.   Date First Assessed/Time First Assessed: 04/21/23 0135   Altered Skin Integrity Present on Admission - Did Patient arrive to the hospital with altered skin?: yes  Orientation: anterior  Location: (c) Groin  Wound Number: #2  Primary Wound Type: Moistu...   Dressing Appearance No dressing   Drainage Amount None   Appearance Pink;Dry   Tissue loss description Partial thickness   Care Cleansed with:;Sterile normal saline   Dressing Applied;Other (comment)  (Triad)   Dressing Change Due 05/29/23 05/29/2023

## 2023-05-29 NOTE — ASSESSMENT & PLAN NOTE
Creatine stable for now. BMP reviewed- noted Estimated Creatinine Clearance: 40 mL/min (based on SCr of 1.3 mg/dL). according to latest data. Monitor UOP and serial BMP and adjust therapy as needed. Renally dose meds.

## 2023-05-29 NOTE — ASSESSMENT & PLAN NOTE
72 y.o. female presents with repeat syncopal episode with associated LOC while having a bowel movement. Suspect vasovagal syncope.  -Labs largely unremarkable.   -, troponin 0.012.  -CXR with interstitial findings may reflect edema, no large focal consolidation.  -CTH with no evidence of acute intracranial pathology. No acute intracranial hemorrhage or significant new edema or mass effect. Chronic right parietal lobe infarct with encephalomalacia. Ill-defined region of hypoattenuation in the left sloane corresponding to presumed metastasis from prior MRI examinations. The other small known intracranial enhancing lesions are inconspicuous on noncontrast CT and better demonstrated on the recent exam from 05/26/2023  -UA non infectious.  -EKG with SR, 73 bpm, no acute ischemic changes.  -Telemetry monitoring.   -Check TSH.   -Check Orthostatics.   -2D Echocardiogram.   -Carotid doppler U/S.   -Fall precautions.

## 2023-05-29 NOTE — HPI
Majo Diego is a 72 y.o. female with a PMHx of CKD, SUZY, DM2, obesity, CVA, HTN, and lung cancer who presents to the ED for syncope. Patient accompanied by granddaughter who is her caregiver.  States that she was previously constipated and went to the bathroom to have a bowel movement. Patient had episode of syncope with loss of consciousness for approximately 1 minute. Patient did not hit her head or fall to the ground since patient's caregiver was holding her. After initial bowel movement, patient had another BM with a small blood clot. Patient was complaining of abdominal pain prior to bowel movement but is no longer endorsing abdominal pain. Patient had 1 episode of emesis with EMS prior to arrival but no additional episodes. The patient's granddaughter reports she did not complain of shortness of breath, chest pain, lightheadedness, or dizziness prior to losing consciousness. She reports the patient has not had lower extremity edema but does note the patient gets easily winded. The patient had a similar syncopal episode a few weeks ago when having a bowel movement and was evaluated in the emergency department.    In the ED, pt mildly hypertensive and hypoxic (92%) was placed on 2L but then weaned to room air again. Afebrile. CBC unremarkable. Cr 1.3 (bl 1.0-1.3). Glucose 169. . Troponin 0.012. EKG with SR, 73 bpm, no acute ischemic changes. UA non infectious. CXR with interstitial findings may reflect edema, no large focal consolidation. CTH with no evidence of acute intracranial pathology. No acute intracranial hemorrhage or significant new edema or mass effect. Chronic right parietal lobe infarct with encephalomalacia. ll-defined region of hypoattenuation in the left sloane corresponding to presumed metastasis from prior MRI examinations.  The other small known intracranial enhancing lesions are inconspicuous on noncontrast CT and better demonstrated on the recent exam from 05/26/2023.

## 2023-05-29 NOTE — ASSESSMENT & PLAN NOTE
-Carbo/etoposide/durvalumab 3/2023; had admission 4/2021 for NATALIE and C diff.  Now planning to start immunotherapy after repeat scans.  -Follows with hem/onc outpatient.

## 2023-05-29 NOTE — SUBJECTIVE & OBJECTIVE
"Past Medical History:   Diagnosis Date    AAA (abdominal aortic aneurysm)     Arthritis     Back pain     BMI 45.0-49.9, adult 12/17/2019    Cancer     CKD (chronic kidney disease)     COPD (chronic obstructive pulmonary disease)     CVA (cerebral vascular accident)     right sided weakness    Diabetes mellitus     Gallbladder & bile duct stone with obstruction     Heart defect     "hole in heart"    Hypertension     Obesity hypoventilation syndrome 12/17/2019    Restrictive lung disease     Steal syndrome as complication of dialysis access 12/17/2019       Past Surgical History:   Procedure Laterality Date    AV FISTULA PLACEMENT Left 08/05/2019    Procedure: CREATION, AV FISTULA;  Surgeon: Sebastian Degroot MD;  Location: [facility] OR;  Service: Cardiovascular;  Laterality: Left;    BONE RESECTION, RIB      2 on back shoulder removed    CHOLECYSTECTOMY      HYSTERECTOMY      INSERTION OF TUNNELED CENTRAL VENOUS CATHETER (CVC) WITH SUBCUTANEOUS PORT Right 3/15/2023    Procedure: RENZHAEDO-SQVB-S-CATH;  Surgeon: Rod Cisse MD;  Location: [facility] OR;  Service: General;  Laterality: Right;  right IJ port a cath    LIGATION OF ARTERIOVENOUS FISTULA Left 10/28/2019    Procedure: LIGATION, AV FISTULA;  Surgeon: Sebastian Degroot MD;  Location: [facility] OR;  Service: Cardiovascular;  Laterality: Left;    LUNG BIOPSY Right 02/16/2023    OOPHORECTOMY         Review of patient's allergies indicates:  No Known Allergies    No current facility-administered medications on file prior to encounter.     Current Outpatient Medications on File Prior to Encounter   Medication Sig    allopurinoL (ZYLOPRIM) 300 MG tablet TAKE 1 TABLET(300 MG) BY MOUTH EVERY DAY    aspirin 81 MG Chew Take 81 mg by mouth once daily.    atorvastatin (LIPITOR) 80 MG tablet Take 1 tablet (80 mg total) by mouth every evening.    clopidogreL (PLAVIX) 75 mg tablet Take 1 tablet (75 mg total) by mouth once daily.    DULoxetine (CYMBALTA) 20 MG capsule Take 1 capsule " (20 mg total) by mouth once daily.    ergocalciferol (ERGOCALCIFEROL) 50,000 unit Cap TAKE 1 CAPSULE BY MOUTH EVERY 7 DAYS    HYDROcodone-acetaminophen (NORCO) 7.5-325 mg per tablet Take 1 tablet by mouth 2 (two) times daily as needed.    magnesium oxide 200 mg magnesium Tab Take 200 mg by mouth once daily.    miconazole (MICOTIN) 2 % cream Apply topically every evening.    ondansetron (ZOFRAN-ODT) 8 MG TbDL Take 1 tablet (8 mg total) by mouth every 8 (eight) hours as needed (nausea/vomiting).    TRADJENTA 5 mg Tab tablet TAKE 1 TABLET(5 MG) BY MOUTH EVERY DAY    acetaminophen (TYLENOL) 500 MG tablet Take 500 mg by mouth every 6 (six) hours as needed for Pain.    albuterol (ACCUNEB) 0.63 mg/3 mL Nebu Take 0.63 mg by nebulization every 6 (six) hours as needed. Rescue    dexAMETHasone (DECADRON) 4 MG Tab Take 2 tablets (8 mg total) by mouth once daily. Take as directed on days 2, 3, and 4 of your chemotherapy cycle.    ipratropium-albuteroL (COMBIVENT)  mcg/actuation inhaler Inhale 1 puff into the lungs every 6 (six) hours as needed for Wheezing or Shortness of Breath. Rescue    miconazole, bulk, Powd 1 application by Misc.(Non-Drug; Combo Route) route 2 (two) times a day.    OLANZapine (ZYPREXA) 5 MG tablet Take 1 tablet (5 mg total) by mouth every evening. Take 1 tablet by mouth every evening on days 1-4 of each chemotherapy cycle     Family History       Problem Relation (Age of Onset)    Cancer Father          Tobacco Use    Smoking status: Former     Packs/day: 2.00     Years: 56.00     Pack years: 112.00     Types: Cigarettes     Start date: 1/10/1967     Quit date: 1/10/2017     Years since quittin.3    Smokeless tobacco: Never   Substance and Sexual Activity    Alcohol use: No    Drug use: No    Sexual activity: Not Currently     Review of Systems   Constitutional:  Negative for appetite change, chills, diaphoresis, fatigue and fever.   HENT:  Negative for congestion, rhinorrhea and sore throat.     Eyes:  Negative for photophobia and visual disturbance.   Respiratory:  Negative for cough, shortness of breath and wheezing.         +URIAS   Cardiovascular:  Negative for chest pain, palpitations and leg swelling.   Gastrointestinal:  Positive for abdominal pain (resolved), blood in stool and vomiting (1 episode). Negative for abdominal distention, diarrhea and nausea.   Genitourinary:  Negative for dysuria, frequency and hematuria.   Musculoskeletal:  Negative for gait problem, joint swelling and neck pain.   Skin:  Negative for color change, pallor, rash and wound.   Neurological:  Positive for syncope. Negative for dizziness, seizures, speech difficulty, weakness, light-headedness and headaches.   Psychiatric/Behavioral:  Negative for confusion and hallucinations. The patient is not nervous/anxious.    Objective:     Vital Signs (Most Recent):  Temp: 97 °F (36.1 °C) (05/28/23 1615)  Pulse: 71 (05/28/23 1917)  Resp: (!) 26 (05/28/23 1902)  BP: (!) 155/83 (05/28/23 1917)  SpO2: 100 % (05/28/23 1917) Vital Signs (24h Range):  Temp:  [97 °F (36.1 °C)] 97 °F (36.1 °C)  Pulse:  [68-85] 71  Resp:  [21-26] 26  SpO2:  [92 %-100 %] 100 %  BP: (144-158)/(69-90) 155/83        There is no height or weight on file to calculate BMI.     Physical Exam  Vitals and nursing note reviewed.   Constitutional:       General: She is not in acute distress.     Appearance: She is obese. She is not toxic-appearing or diaphoretic.      Comments: Chronically ill appearing   HENT:      Head: Normocephalic and atraumatic.      Nose: Nose normal.      Mouth/Throat:      Mouth: Mucous membranes are moist.   Eyes:      Pupils: Pupils are equal, round, and reactive to light.   Cardiovascular:      Rate and Rhythm: Normal rate and regular rhythm.      Pulses: Normal pulses.   Pulmonary:      Effort: Pulmonary effort is normal. No respiratory distress.      Breath sounds: No wheezing, rhonchi or rales.      Comments: Currently on room  air  Abdominal:      General: Bowel sounds are normal. There is no distension.      Palpations: Abdomen is soft.      Tenderness: There is no abdominal tenderness. There is no guarding.   Musculoskeletal:         General: Normal range of motion.      Cervical back: Normal range of motion.      Right lower leg: No edema.      Left lower leg: No edema.   Skin:     General: Skin is warm and dry.      Capillary Refill: Capillary refill takes less than 2 seconds.   Neurological:      Mental Status: She is alert. Mental status is at baseline.      Cranial Nerves: No dysarthria or facial asymmetry.      Sensory: No sensory deficit.      Motor: No weakness.      Comments: Oriented x2. At baseline per granddaughter. Following commands appropriately.   Psychiatric:         Mood and Affect: Mood normal.         Behavior: Behavior normal.            CRANIAL NERVES     CN III, IV, VI   Pupils are equal, round, and reactive to light.     Significant Labs: All pertinent labs within the past 24 hours have been reviewed.  CBC:   Recent Labs   Lab 05/28/23  1725   WBC 12.35   HGB 12.1   HCT 37.6   *     CMP:   Recent Labs   Lab 05/28/23  1725      K 4.6      CO2 23   *   BUN 20   CREATININE 1.3   CALCIUM 9.4   PROT 7.0   ALBUMIN 3.4*   BILITOT 0.6   ALKPHOS 123   AST 30   ALT 18   ANIONGAP 13     Urine Studies:   Recent Labs   Lab 05/28/23  1850   COLORU Yellow   APPEARANCEUA Hazy*   PHUR 6.0   SPECGRAV 1.030   PROTEINUA 2+*   GLUCUA Trace*   KETONESU Trace*   BILIRUBINUA 1+*   OCCULTUA Negative   NITRITE Negative   LEUKOCYTESUR Negative   RBCUA 1   WBCUA 2   BACTERIA Rare   SQUAMEPITHEL 1   HYALINECASTS 11*       Significant Imaging: I have reviewed all pertinent imaging results/findings within the past 24 hours.    Imaging Results              CT Head Without Contrast (Final result)  Result time 05/28/23 18:14:48      Final result by Eugene Castaneda MD (05/28/23 18:14:48)                   Impression:      1.   No evidence of acute intracranial pathology.  No acute intracranial hemorrhage or significant new edema or mass effect.    2.  Chronic right parietal lobe infarct with encephalomalacia.    3.  Ill-defined region of hypoattenuation in the left sloane corresponding to presumed metastasis from prior MRI examinations.  The other small known intracranial enhancing lesions are inconspicuous on noncontrast CT and better demonstrated on the recent exam from 05/26/2023.    Electronically signed by resident: Christophe Alvarez  Date:    05/28/2023  Time:    18:02    Electronically signed by: Eugene Castaneda MD  Date:    05/28/2023  Time:    18:14               Narrative:    EXAMINATION:  CT HEAD WITHOUT CONTRAST    CLINICAL HISTORY:  syncope, lung CA;    TECHNIQUE:  Low dose axial CT images obtained throughout the head without the use of intravenous contrast.  Axial, sagittal and coronal reconstructions were performed.    COMPARISON:  MRI brain 05/26/2023 and CT head 04/21/2023.    FINDINGS:  Intracranial compartment:    The ventricles are stable in size and configuration without evidence of hydrocephalus.    Stable moderate-sized region of encephalomalacia in the right parietal lobe distant with prior infarct.  No evidence acute major vascular territory infarct.  No new regions of significant edema, mass effect, or midline shift.  No acute intraparenchymal hemorrhage.    Small vague area of hypoattenuation in the left sloane corresponding to site of known enhancing intracranial metastasis from prior recent MRI 05/26/2023.  Scattered small foci of hypoattenuation elsewhere in the supratentorial white matter which are nonspecific but compatible with chronic microvascular ischemic changes.    No extra-axial blood or fluid collections.    Skull/extracranial contents (limited evaluation):    No fracture. Mastoid air cells and partially imaged paranasal sinuses are essentially clear.                                       X-Ray Chest AP  Portable (Final result)  Result time 05/28/23 17:35:34      Final result by Alonzo Macdonald MD (05/28/23 17:35:34)                   Impression:      1. Interstitial findings may reflect edema, no large focal consolidation.      Electronically signed by: Alonzo Macdonald MD  Date:    05/28/2023  Time:    17:35               Narrative:    EXAMINATION:  XR CHEST AP PORTABLE    CLINICAL HISTORY:  tachypnea;    TECHNIQUE:  Single frontal view of the chest was performed.    COMPARISON:  04/21/2023    FINDINGS:  The cardiomediastinal silhouette is prominent, similar to the previous exam..  There is no pleural effusion.  The trachea is midline.  The lungs are symmetrically expanded bilaterally with coarse interstitial attenuation bilaterally.  There is bilateral basilar subsegmental atelectasis or scarring..  No large focal consolidation seen.  There is no pneumothorax.  The osseous structures are remarkable for degenerative changes..

## 2023-05-29 NOTE — H&P
[facility] - Emergency Dept  [facility] Medicine  History & Physical    Patient Name: Majo Diego  MRN: [id number]  Patient Class: OP- Observation  Admission Date: 5/28/2023  Attending Physician: Aida Cabello MD   Primary Care Provider: Mila Villegas MD         Patient information was obtained from patient, past medical records, and ER records.     Subjective:     Principal Problem:Syncope    Chief Complaint:   Chief Complaint   Patient presents with    Loss of Consciousness     EMS reports that pt had syncopal episode on toilet today. Reports bloody stools w/ bright red blood. Aox2, oriented to person and place. Hx lung cx.         HPI: Majo Diego is a 72 y.o. female with a PMHx of CKD, SUZY, DM2, obesity, CVA, HTN, and lung cancer who presents to the ED for syncope. Patient accompanied by granddaughter who is her caregiver.  States that she was previously constipated and went to the bathroom to have a bowel movement. Patient had episode of syncope with loss of consciousness for approximately 1 minute. Patient did not hit her head or fall to the ground since patient's caregiver was holding her. After initial bowel movement, patient had another BM with a small blood clot. Patient was complaining of abdominal pain prior to bowel movement but is no longer endorsing abdominal pain. Patient had 1 episode of emesis with EMS prior to arrival but no additional episodes. The patient's granddaughter reports she did not complain of shortness of breath, chest pain, lightheadedness, or dizziness prior to losing consciousness. She reports the patient has not had lower extremity edema but does note the patient gets easily winded. The patient had a similar syncopal episode a few weeks ago when having a bowel movement and was evaluated in the emergency department.    In the ED, pt mildly hypertensive and hypoxic (92%) was placed on 2L but then weaned to room air again. Afebrile. CBC unremarkable. Cr 1.3 (bl  "1.0-1.3). Glucose 169. . Troponin 0.012. EKG with SR, 73 bpm, no acute ischemic changes. UA non infectious. CXR with interstitial findings may reflect edema, no large focal consolidation. CTH with no evidence of acute intracranial pathology. No acute intracranial hemorrhage or significant new edema or mass effect. Chronic right parietal lobe infarct with encephalomalacia. ll-defined region of hypoattenuation in the left sloane corresponding to presumed metastasis from prior MRI examinations.  The other small known intracranial enhancing lesions are inconspicuous on noncontrast CT and better demonstrated on the recent exam from 05/26/2023.     Past Medical History:   Diagnosis Date    AAA (abdominal aortic aneurysm)     Arthritis     Back pain     BMI 45.0-49.9, adult 12/17/2019    Cancer     CKD (chronic kidney disease)     COPD (chronic obstructive pulmonary disease)     CVA (cerebral vascular accident)     right sided weakness    Diabetes mellitus     Gallbladder & bile duct stone with obstruction     Heart defect     "hole in heart"    Hypertension     Obesity hypoventilation syndrome 12/17/2019    Restrictive lung disease     Steal syndrome as complication of dialysis access 12/17/2019       Past Surgical History:   Procedure Laterality Date    AV FISTULA PLACEMENT Left 08/05/2019    Procedure: CREATION, AV FISTULA;  Surgeon: Sebastian Degroot MD;  Location: [facility] OR;  Service: Cardiovascular;  Laterality: Left;    BONE RESECTION, RIB      2 on back shoulder removed    CHOLECYSTECTOMY      HYSTERECTOMY      INSERTION OF TUNNELED CENTRAL VENOUS CATHETER (CVC) WITH SUBCUTANEOUS PORT Right 3/15/2023    Procedure: [id number]-CATH;  Surgeon: Rod Cisse MD;  Location: [facility] OR;  Service: General;  Laterality: Right;  right IJ port a cath    LIGATION OF ARTERIOVENOUS FISTULA Left 10/28/2019    Procedure: LIGATION, AV FISTULA;  Surgeon: Sebastian Degroot MD;  Location: [facility] OR;  Service: Cardiovascular;  " Laterality: Left;    LUNG BIOPSY Right 02/16/2023    OOPHORECTOMY         Review of patient's allergies indicates:  No Known Allergies    No current facility-administered medications on file prior to encounter.     Current Outpatient Medications on File Prior to Encounter   Medication Sig    allopurinoL (ZYLOPRIM) 300 MG tablet TAKE 1 TABLET(300 MG) BY MOUTH EVERY DAY    aspirin 81 MG Chew Take 81 mg by mouth once daily.    atorvastatin (LIPITOR) 80 MG tablet Take 1 tablet (80 mg total) by mouth every evening.    clopidogreL (PLAVIX) 75 mg tablet Take 1 tablet (75 mg total) by mouth once daily.    DULoxetine (CYMBALTA) 20 MG capsule Take 1 capsule (20 mg total) by mouth once daily.    ergocalciferol (ERGOCALCIFEROL) 50,000 unit Cap TAKE 1 CAPSULE BY MOUTH EVERY 7 DAYS    HYDROcodone-acetaminophen (NORCO) 7.5-325 mg per tablet Take 1 tablet by mouth 2 (two) times daily as needed.    magnesium oxide 200 mg magnesium Tab Take 200 mg by mouth once daily.    miconazole (MICOTIN) 2 % cream Apply topically every evening.    ondansetron (ZOFRAN-ODT) 8 MG TbDL Take 1 tablet (8 mg total) by mouth every 8 (eight) hours as needed (nausea/vomiting).    TRADJENTA 5 mg Tab tablet TAKE 1 TABLET(5 MG) BY MOUTH EVERY DAY    acetaminophen (TYLENOL) 500 MG tablet Take 500 mg by mouth every 6 (six) hours as needed for Pain.    albuterol (ACCUNEB) 0.63 mg/3 mL Nebu Take 0.63 mg by nebulization every 6 (six) hours as needed. Rescue    dexAMETHasone (DECADRON) 4 MG Tab Take 2 tablets (8 mg total) by mouth once daily. Take as directed on days 2, 3, and 4 of your chemotherapy cycle.    ipratropium-albuteroL (COMBIVENT)  mcg/actuation inhaler Inhale 1 puff into the lungs every 6 (six) hours as needed for Wheezing or Shortness of Breath. Rescue    miconazole, bulk, Powd 1 application by Misc.(Non-Drug; Combo Route) route 2 (two) times a day.    OLANZapine (ZYPREXA) 5 MG tablet Take 1 tablet (5 mg total) by mouth every evening. Take 1  tablet by mouth every evening on days 1-4 of each chemotherapy cycle     Family History       Problem Relation (Age of Onset)    Cancer Father          Tobacco Use    Smoking status: Former     Packs/day: 2.00     Years: 56.00     Pack years: 112.00     Types: Cigarettes     Start date: 1/10/1967     Quit date: 1/10/2017     Years since quittin.3    Smokeless tobacco: Never   Substance and Sexual Activity    Alcohol use: No    Drug use: No    Sexual activity: Not Currently     Review of Systems   Constitutional:  Negative for appetite change, chills, diaphoresis, fatigue and fever.   HENT:  Negative for congestion, rhinorrhea and sore throat.    Eyes:  Negative for photophobia and visual disturbance.   Respiratory:  Negative for cough, shortness of breath and wheezing.         +URIAS   Cardiovascular:  Negative for chest pain, palpitations and leg swelling.   Gastrointestinal:  Positive for abdominal pain (resolved), blood in stool and vomiting (1 episode). Negative for abdominal distention, diarrhea and nausea.   Genitourinary:  Negative for dysuria, frequency and hematuria.   Musculoskeletal:  Negative for gait problem, joint swelling and neck pain.   Skin:  Negative for color change, pallor, rash and wound.   Neurological:  Positive for syncope. Negative for dizziness, seizures, speech difficulty, weakness, light-headedness and headaches.   Psychiatric/Behavioral:  Negative for confusion and hallucinations. The patient is not nervous/anxious.    Objective:     Vital Signs (Most Recent):  Temp: 97 °F (36.1 °C) (23 1615)  Pulse: 71 (23)  Resp: (!) 26 (23 190)  BP: (!) 155/83 (23)  SpO2: 100 % (23) Vital Signs (24h Range):  Temp:  [97 °F (36.1 °C)] 97 °F (36.1 °C)  Pulse:  [68-85] 71  Resp:  [21-26] 26  SpO2:  [92 %-100 %] 100 %  BP: (144-158)/(69-90) 155/83        There is no height or weight on file to calculate BMI.     Physical Exam  Vitals and nursing note  reviewed.   Constitutional:       General: She is not in acute distress.     Appearance: She is obese. She is not toxic-appearing or diaphoretic.      Comments: Chronically ill appearing   HENT:      Head: Normocephalic and atraumatic.      Nose: Nose normal.      Mouth/Throat:      Mouth: Mucous membranes are moist.   Eyes:      Pupils: Pupils are equal, round, and reactive to light.   Cardiovascular:      Rate and Rhythm: Normal rate and regular rhythm.      Pulses: Normal pulses.   Pulmonary:      Effort: Pulmonary effort is normal. No respiratory distress.      Breath sounds: No wheezing, rhonchi or rales.      Comments: Currently on room air  Abdominal:      General: Bowel sounds are normal. There is no distension.      Palpations: Abdomen is soft.      Tenderness: There is no abdominal tenderness. There is no guarding.   Musculoskeletal:         General: Normal range of motion.      Cervical back: Normal range of motion.      Right lower leg: No edema.      Left lower leg: No edema.   Skin:     General: Skin is warm and dry.      Capillary Refill: Capillary refill takes less than 2 seconds.   Neurological:      Mental Status: She is alert. Mental status is at baseline.      Cranial Nerves: No dysarthria or facial asymmetry.      Sensory: No sensory deficit.      Motor: No weakness.      Comments: Oriented x2. At baseline per granddaughter. Following commands appropriately.   Psychiatric:         Mood and Affect: Mood normal.         Behavior: Behavior normal.            CRANIAL NERVES     CN III, IV, VI   Pupils are equal, round, and reactive to light.     Significant Labs: All pertinent labs within the past 24 hours have been reviewed.  CBC:   Recent Labs   Lab 05/28/23  1725   WBC 12.35   HGB 12.1   HCT 37.6   *     CMP:   Recent Labs   Lab 05/28/23  1725      K 4.6      CO2 23   *   BUN 20   CREATININE 1.3   CALCIUM 9.4   PROT 7.0   ALBUMIN 3.4*   BILITOT 0.6   ALKPHOS 123   AST 30    ALT 18   ANIONGAP 13     Urine Studies:   Recent Labs   Lab 05/28/23  1850   COLORU Yellow   APPEARANCEUA Hazy*   PHUR 6.0   SPECGRAV 1.030   PROTEINUA 2+*   GLUCUA Trace*   KETONESU Trace*   BILIRUBINUA 1+*   OCCULTUA Negative   NITRITE Negative   LEUKOCYTESUR Negative   RBCUA 1   WBCUA 2   BACTERIA Rare   SQUAMEPITHEL 1   HYALINECASTS 11*       Significant Imaging: I have reviewed all pertinent imaging results/findings within the past 24 hours.    Imaging Results              CT Head Without Contrast (Final result)  Result time 05/28/23 18:14:48      Final result by Eugene Castaneda MD (05/28/23 18:14:48)                   Impression:      1.  No evidence of acute intracranial pathology.  No acute intracranial hemorrhage or significant new edema or mass effect.    2.  Chronic right parietal lobe infarct with encephalomalacia.    3.  Ill-defined region of hypoattenuation in the left sloane corresponding to presumed metastasis from prior MRI examinations.  The other small known intracranial enhancing lesions are inconspicuous on noncontrast CT and better demonstrated on the recent exam from 05/26/2023.    Electronically signed by resident: Christophe Alvarez  Date:    05/28/2023  Time:    18:02    Electronically signed by: Eugene Castaneda MD  Date:    05/28/2023  Time:    18:14               Narrative:    EXAMINATION:  CT HEAD WITHOUT CONTRAST    CLINICAL HISTORY:  syncope, lung CA;    TECHNIQUE:  Low dose axial CT images obtained throughout the head without the use of intravenous contrast.  Axial, sagittal and coronal reconstructions were performed.    COMPARISON:  MRI brain 05/26/2023 and CT head 04/21/2023.    FINDINGS:  Intracranial compartment:    The ventricles are stable in size and configuration without evidence of hydrocephalus.    Stable moderate-sized region of encephalomalacia in the right parietal lobe distant with prior infarct.  No evidence acute major vascular territory infarct.  No new regions of significant  edema, mass effect, or midline shift.  No acute intraparenchymal hemorrhage.    Small vague area of hypoattenuation in the left sloane corresponding to site of known enhancing intracranial metastasis from prior recent MRI 05/26/2023.  Scattered small foci of hypoattenuation elsewhere in the supratentorial white matter which are nonspecific but compatible with chronic microvascular ischemic changes.    No extra-axial blood or fluid collections.    Skull/extracranial contents (limited evaluation):    No fracture. Mastoid air cells and partially imaged paranasal sinuses are essentially clear.                                       X-Ray Chest AP Portable (Final result)  Result time 05/28/23 17:35:34      Final result by Alonzo Macdonald MD (05/28/23 17:35:34)                   Impression:      1. Interstitial findings may reflect edema, no large focal consolidation.      Electronically signed by: Alonzo Macdonald MD  Date:    05/28/2023  Time:    17:35               Narrative:    EXAMINATION:  XR CHEST AP PORTABLE    CLINICAL HISTORY:  tachypnea;    TECHNIQUE:  Single frontal view of the chest was performed.    COMPARISON:  04/21/2023    FINDINGS:  The cardiomediastinal silhouette is prominent, similar to the previous exam..  There is no pleural effusion.  The trachea is midline.  The lungs are symmetrically expanded bilaterally with coarse interstitial attenuation bilaterally.  There is bilateral basilar subsegmental atelectasis or scarring..  No large focal consolidation seen.  There is no pneumothorax.  The osseous structures are remarkable for degenerative changes..                                      Assessment/Plan:     * Syncope  72 y.o. female presents with repeat syncopal episode with associated LOC while having a bowel movement. Suspect vasovagal syncope.  -Labs largely unremarkable.   -, troponin 0.012.  -CXR with interstitial findings may reflect edema, no large focal consolidation.  -CTH with no  evidence of acute intracranial pathology. No acute intracranial hemorrhage or significant new edema or mass effect. Chronic right parietal lobe infarct with encephalomalacia. Ill-defined region of hypoattenuation in the left sloane corresponding to presumed metastasis from prior MRI examinations. The other small known intracranial enhancing lesions are inconspicuous on noncontrast CT and better demonstrated on the recent exam from 05/26/2023  -UA non infectious.  -EKG with SR, 73 bpm, no acute ischemic changes.  -Telemetry monitoring.   -Check TSH.   -Check Orthostatics.   -2D Echocardiogram.   -Carotid doppler U/S.   -Fall precautions.     CKD (chronic kidney disease)  Creatine stable for now. BMP reviewed- noted Estimated Creatinine Clearance: 40 mL/min (based on SCr of 1.3 mg/dL). according to latest data. Monitor UOP and serial BMP and adjust therapy as needed. Renally dose meds.    Small cell lung cancer  -Carbo/etoposide/durvalumab 3/2023; had admission 4/2021 for NATALIE and C diff.  Now planning to start immunotherapy after repeat scans.  -Follows with hem/onc outpatient.    SUZY (obstructive sleep apnea)  Not on home CPAP.    History of CVA (cerebrovascular accident)  -Continue ASA and statin.    Essential hypertension  -Chronic, mildly elevated.  -Patient was normotensive during last nephrology visit per granddaughter so lisinopril-hctz 10/12.5mg was discontinued on a trial basis but states she never heard anything else about it.   -Will restart lower dose of lisinopril 5mg.  -Continue to monitor BP closely.    Type 2 diabetes mellitus with chronic kidney disease, without long-term current use of insulin  Patient's FSGs are controlled on current medication regimen.  Last A1c reviewed-   Lab Results   Component Value Date    HGBA1C 5.5 05/15/2023     Most recent fingerstick glucose reviewed-   Recent Labs   Lab 05/29/23  0045   POCTGLUCOSE 118*     Current correctional scale  Low  Maintain anti-hyperglycemic dose  as follows-   Antihyperglycemics (From admission, onward)      Start     Stop Route Frequency Ordered    05/28/23 2224  insulin aspart U-100 pen 0-5 Units         -- SubQ Before meals & nightly PRN 05/28/23 2129          Hold Oral hypoglycemics while patient is in the hospital.  -Accuchecks AC/HS      VTE Risk Mitigation (From admission, onward)           Ordered     enoxaparin injection 40 mg  Every 24 hours         05/29/23 0430     IP VTE HIGH RISK PATIENT  Once         05/28/23 2129     Place sequential compression device  Until discontinued         05/28/23 2129                         On 05/28/2023, patient should be placed in hospital observation services under my care in collaboration with Reddy Weldon MD.      Sana Hameed NP  Department of Hospital Medicine  [facility] [place] - Emergency Dept

## 2023-05-29 NOTE — DISCHARGE SUMMARY
[facility] - Observation [address] [place] [address] Medicine  Discharge Summary      Patient Name: Majo Diego  MRN: [id number]  CLAUDIO: [id number]  Patient Class: OP- Observation  Admission Date: 5/28/2023  Hospital Length of Stay: 0 days  Discharge Date and Time:  05/29/2023 3:15 PM  Attending Physician: Aida Cabello MD   Discharging Provider: Aida Cabello MD  Primary Care Provider: Mila Villegas MD  [facility] Medicine Team: [facility]OSP MED  Aida Cabello MD  Primary Care Team: [facility] [place]    HPI:   Majo Diego is a 72 y.o. female with a PMHx of CKD, SUZY, DM2, obesity, CVA, HTN, and lung cancer who presents to the ED for syncope. Patient accompanied by granddaughter who is her caregiver.  States that she was previously constipated and went to the bathroom to have a bowel movement. Patient had episode of syncope with loss of consciousness for approximately 1 minute. Patient did not hit her head or fall to the ground since patient's caregiver was holding her. After initial bowel movement, patient had another BM with a small blood clot. Patient was complaining of abdominal pain prior to bowel movement but is no longer endorsing abdominal pain. Patient had 1 episode of emesis with EMS prior to arrival but no additional episodes. The patient's granddaughter reports she did not complain of shortness of breath, chest pain, lightheadedness, or dizziness prior to losing consciousness. She reports the patient has not had lower extremity edema but does note the patient gets easily winded. The patient had a similar syncopal episode a few weeks ago when having a bowel movement and was evaluated in the emergency department.    In the ED, pt mildly hypertensive and hypoxic (92%) was placed on 2L but then weaned to room air again. Afebrile. CBC unremarkable. Cr 1.3 (bl 1.0-1.3). Glucose 169. . Troponin 0.012. EKG with SR, 73 bpm, no acute ischemic changes. UA non infectious. CXR with interstitial findings may reflect  edema, no large focal consolidation. CTH with no evidence of acute intracranial pathology. No acute intracranial hemorrhage or significant new edema or mass effect. Chronic right parietal lobe infarct with encephalomalacia. ll-defined region of hypoattenuation in the left sloane corresponding to presumed metastasis from prior MRI examinations.  The other small known intracranial enhancing lesions are inconspicuous on noncontrast CT and better demonstrated on the recent exam from 05/26/2023.       * No surgery found *      Hospital Course:   Patient admitted to Hospital Medicine for evaluation of syncopal episode.  Reportedly, 2nd syncopal episode in the setting of straining while trying to have bowel movement.  Patient with chronic constipation due to opioid use; not on a bowel regimen at home.  Vasovagal syncope most likely.   without overt signs or symptoms of volume overload, troponin 0.012.  UA non infectious, EKG and telemetry while admitted unremarkable for arrhythmia.  TSH within normal limits.  Carotid Doppler ultrasound without significant stenosis.  Echo with low normal systolic function and indeterminate left ventricular diastolic function.  Orthostatics not significant for orthostatic hypotension.  Patient without further hospital needs.  Bowel regimen prescribed.  Return precautions advised.  Patient with standing Oncology appointment 5/30.  Ambulatory referral placed to cardiology given Echo with low-normal systolic function; defer GDMT titration to ambulatory setting as concern for ability to tolerate especially considering ongoing therapy for lung cancer - was initiated on low-dose lisinopril this admission, however.  Patient and granddaughter in agreement with discharge plan.    Vitals:    05/29/23 0743 05/29/23 1131 05/29/23 1207 05/29/23 1339   BP: (!) 144/64  138/76    BP Location: Right arm  Right arm    Patient Position: Lying  Lying    Pulse: 72 81 77 72   Resp: 18  18 18   Temp: 97.5 °F  (36.4 °C)  97.6 °F (36.4 °C)    TempSrc: Oral  Oral    SpO2: (!) 94%  95% 95%   Weight:       Height:         Physical Exam  Vitals and nursing note reviewed.   Constitutional:       General: She is not in acute distress.     Appearance: She is obese. She is not toxic-appearing or diaphoretic.      Comments: Chronically ill appearing   HENT:      Head: Normocephalic and atraumatic.      Nose: Nose normal.      Mouth/Throat:      Mouth: Mucous membranes are moist.   Eyes:      Pupils: Pupils are equal, round, and reactive to light.   Cardiovascular:      Rate and Rhythm: Normal rate and regular rhythm.      Pulses: Normal pulses.   Pulmonary:      Effort: Pulmonary effort is normal. No respiratory distress.      Breath sounds: No wheezing, rhonchi or rales.      Comments: on room air  Abdominal:      General: Bowel sounds are normal. There is no distension.      Palpations: Abdomen is soft.      Tenderness: There is no abdominal tenderness. There is no guarding.   Skin:     General: Skin is warm and dry.      Capillary Refill: Capillary refill takes less than 2 seconds.   Neurological:      Mental Status: She is alert. Mental status is at baseline.      Comments: Oriented x2. At baseline per granddaughter. Following commands appropriately.          Goals of Care Treatment Preferences:  Code Status: Full Code      Consults:   Consults (From admission, onward)        Status Ordering Provider     Inpatient consult to Registered Dietitian/Nutritionist  Once        Provider:  (Not yet assigned)    WICHO Kirk          No new Assessment & Plan notes have been filed under this hospital service since the last note was generated.  Service: Hospital Medicine    Final Active Diagnoses:    Diagnosis Date Noted POA    PRINCIPAL PROBLEM:  Syncope [R55] 05/28/2023 Yes    CKD (chronic kidney disease) [N18.9] 04/21/2023 Yes    Small cell lung cancer [C34.90] 02/24/2023 Yes    SUZY (obstructive sleep apnea)  [G47.33] 12/17/2019 Yes    History of CVA (cerebrovascular accident) [Z86.73] 08/28/2018 Not Applicable    Essential hypertension [I10] 08/28/2018 Yes    Type 2 diabetes mellitus with chronic kidney disease, without long-term current use of insulin [E11.22] 08/28/2018 Yes      Problems Resolved During this Admission:       Discharged Condition: stable    Disposition: Home or Self Care    Follow Up:    Patient Instructions:      Ambulatory referral/consult to Cardiology   Standing Status: Future   Referral Priority: Urgent Referral Type: Consultation   Referral Reason: Specialty Services Required   Requested Specialty: Cardiology   Number of Visits Requested: 1     Ambulatory referral/consult to Internal Medicine   Standing Status: Future   Referral Priority: Urgent Referral Type: Consultation   Referral Reason: Specialty Services Required   Requested Specialty: Internal Medicine   Number of Visits Requested: 1     Diet diabetic     Diet Cardiac     Notify your health care provider if you experience any of the following:  temperature >100.4     Notify your health care provider if you experience any of the following:  persistent nausea and vomiting or diarrhea     Notify your health care provider if you experience any of the following:  severe uncontrolled pain     Notify your health care provider if you experience any of the following:  difficulty breathing or increased cough     Notify your health care provider if you experience any of the following:  severe persistent headache     Notify your health care provider if you experience any of the following:  persistent dizziness, light-headedness, or visual disturbances     Notify your health care provider if you experience any of the following:  increased confusion or weakness     Activity as tolerated       Significant Diagnostic Studies: Labs:   CMP   Recent Labs   Lab 05/28/23  1725 05/29/23  0443    141   K 4.6 3.8    106   CO2 23 24   * 101    BUN 20 21   CREATININE 1.3 1.2   CALCIUM 9.4 9.1   PROT 7.0 5.8*   ALBUMIN 3.4* 2.9*   BILITOT 0.6 0.7   ALKPHOS 123 102   AST 30 18   ALT 18 14   ANIONGAP 13 11    and CBC   Recent Labs   Lab 05/28/23  1725 05/29/23  0443   WBC 12.35 9.81   HGB 12.1 10.7*   HCT 37.6 33.7*   * 147*       Pending Diagnostic Studies:     None         Medications:  Reconciled Home Medications:      Medication List      START taking these medications    lactulose 10 gram/15 mL solution  Commonly known as: CHRONULAC  Take 45 mLs (30 g total) by mouth every 8 (eight) hours as needed (Constipation).     lisinopriL 5 MG tablet  Commonly known as: PRINIVIL,ZESTRIL  Take 1 tablet (5 mg total) by mouth once daily.  Start taking on: May 30, 2023     senna-docusate 8.6-50 mg 8.6-50 mg per tablet  Commonly known as: PERICOLACE  Take 1 tablet by mouth 2 (two) times daily as needed for Constipation.        CONTINUE taking these medications    acetaminophen 500 MG tablet  Commonly known as: TYLENOL  Take 500 mg by mouth every 6 (six) hours as needed for Pain.     albuterol 0.63 mg/3 mL Nebu  Commonly known as: ACCUNEB  Take 0.63 mg by nebulization every 6 (six) hours as needed. Rescue     allopurinoL 300 MG tablet  Commonly known as: ZYLOPRIM  TAKE 1 TABLET(300 MG) BY MOUTH EVERY DAY     aspirin 81 MG Chew  Take 81 mg by mouth once daily.     atorvastatin 80 MG tablet  Commonly known as: LIPITOR  Take 1 tablet (80 mg total) by mouth every evening.     clopidogreL 75 mg tablet  Commonly known as: PLAVIX  Take 1 tablet (75 mg total) by mouth once daily.     dexAMETHasone 4 MG Tab  Commonly known as: DECADRON  Take 2 tablets (8 mg total) by mouth once daily. Take as directed on days 2, 3, and 4 of your chemotherapy cycle.     DULoxetine 20 MG capsule  Commonly known as: CYMBALTA  Take 1 capsule (20 mg total) by mouth once daily.     ergocalciferol 50,000 unit Cap  Commonly known as: ERGOCALCIFEROL  TAKE 1 CAPSULE BY MOUTH EVERY 7 DAYS      HYDROcodone-acetaminophen 7.5-325 mg per tablet  Commonly known as: NORCO  Take 1 tablet by mouth 2 (two) times daily as needed.     ipratropium-albuteroL  mcg/actuation inhaler  Commonly known as: CombiVENT  Inhale 1 puff into the lungs every 6 (six) hours as needed for Wheezing or Shortness of Breath. Rescue     magnesium oxide 200 mg magnesium Tab  Take 200 mg by mouth once daily.     miconazole (bulk) Powd  1 application by Misc.(Non-Drug; Combo Route) route 2 (two) times a day.     miconazole 2 % cream  Commonly known as: MICOTIN  Apply topically every evening.     OLANZapine 5 MG tablet  Commonly known as: ZyPREXA  Take 1 tablet (5 mg total) by mouth every evening. Take 1 tablet by mouth every evening on days 1-4 of each chemotherapy cycle     ondansetron 8 MG Tbdl  Commonly known as: ZOFRAN-ODT  Take 1 tablet (8 mg total) by mouth every 8 (eight) hours as needed (nausea/vomiting).     TRADJENTA 5 mg Tab tablet  Generic drug: linaGLIPtin  TAKE 1 TABLET(5 MG) BY MOUTH EVERY DAY            Indwelling Lines/Drains at time of discharge:   Lines/Drains/Airways     Drain  Duration                Hemodialysis AV Fistula -- days    Female External Urinary Catheter 05/28/23 1750 <1 day                Time spent on the discharge of patient: 45 minutes         Aida Cabello MD  Department of Hospital Medicine  [facility] - Observation 11H

## 2023-05-29 NOTE — ASSESSMENT & PLAN NOTE
Patient's FSGs are controlled on current medication regimen.  Last A1c reviewed-   Lab Results   Component Value Date    HGBA1C 5.5 05/15/2023     Most recent fingerstick glucose reviewed-   Recent Labs   Lab 05/29/23  0045   POCTGLUCOSE 118*     Current correctional scale  Low  Maintain anti-hyperglycemic dose as follows-   Antihyperglycemics (From admission, onward)    Start     Stop Route Frequency Ordered    05/28/23 2224  insulin aspart U-100 pen 0-5 Units         -- SubQ Before meals & nightly PRN 05/28/23 2129        Hold Oral hypoglycemics while patient is in the hospital.  -Accuchecks AC/HS

## 2023-05-29 NOTE — PLAN OF CARE
[facility]  Discharge Assessment    Primary Care Provider: Mila Villegas MD     Discharge Assessment (most recent)       BRIEF DISCHARGE ASSESSMENT - 05/29/23 1219          Discharge Planning    Assessment Type Discharge Planning Brief Assessment     Resource/Environmental Concerns none     Support Systems Children;Family members     Equipment Currently Used at Home wheelchair;walker, rolling;hospital bed;bedside commode     Current Living Arrangements home     Patient/Family Anticipates Transition to home;home with family     Patient/Family Anticipated Services at Transition none     DME Needed Upon Discharge  none     Discharge Plan A Home with family     Discharge Plan B Home with family        Physical Activity    On average, how many days per week do you engage in moderate to strenuous exercise (like a brisk walk)? 0 days     On average, how many minutes do you engage in exercise at this level? 0 min        Financial Resource Strain    How hard is it for you to pay for the very basics like food, housing, medical care, and heating? Not very hard        Housing Stability    In the last 12 months, was there a time when you were not able to pay the mortgage or rent on time? No     In the last 12 months, how many places have you lived? 1     In the last 12 months, was there a time when you did not have a steady place to sleep or slept in a shelter (including now)? No        Transportation Needs    In the past 12 months, has lack of transportation kept you from medical appointments or from getting medications? No     In the past 12 months, has lack of transportation kept you from meetings, work, or from getting things needed for daily living? No                     Pt is a 72 y.o. female admitted with Syncope and has a PMH of CKD, SUZY, DM2 and CVA. She lives with her granddaughter in a single story house with a ramp. She is independent with her self care but needs assistance to get OOB.  [facility] given to patient and/or family with understanding verbalized.   name and number and estimated discharge date written on white board in patient's room with request to call for any questions or concerns.  Will continue to follow for needs.  Alex Vidal RN,BSN

## 2023-05-29 NOTE — PLAN OF CARE
[facility] 11H  Discharge Final Note    Primary Care Provider: Mila Villegsa MD    Expected Discharge Date: 5/29/2023    Future Appointments   Date Time Provider Department Center   5/30/2023 10:05 AM [facility] LAB [facility]   5/30/2023 11:00 AM Javid Campuzano MD [facility] ONCOL [place] 4TH [place]   5/30/2023 11:30 AM CHAIR 10, [facility] INFUS [facility] 4TH [place]   12/5/2023  8:30 AM Janeen Hernandez MD [facility] CCCLIN [facility]   12/27/2023 11:00 AM BEN Abernathy IV, MD [facility] DE[facility]NE [place]     Pt discharged home with no services. CM attempted to schedule  Cardiology Clinic referral. No appointments available in the requested time,   will send message to clinic nurse to contact pt/family with appointment. Note put in AVS.   Alex Vidal RN,BSN      Final Discharge Note (most recent)       Final Note - 05/29/23 1611          Final Note    Assessment Type Final Discharge Note     Anticipated Discharge Disposition Home or Self Care     Hospital Resources/Appts/Education Provided Appointments scheduled and added to AVS;Appointments scheduled by Navigator/Coordinator        Post-Acute Status    Discharge Delays None known at this time                     Important Message from Medicare             Contact Info       Cardiology Clinic        Next Steps: Follow up    Instructions: The clinic nurse will call you with a follow up appointment. If you do not hear from them in 48 hours, please call [phone]

## 2023-05-29 NOTE — NURSING
Report received from IRLANDA Poe. Patient awake, alert, and oriented x 4. Lying in bed-semi fowlers position. NAD noted. Contact plus precautions are in place. Daughter at bedside. Respirations are even and unlabored. Tele monitor in place. Plan of care reviewed. Education provided. Instruction given on use of call light. Bed locked and in lowest position. All safety measures are in place. Communication board updated with direct nursing extension. RN will continue to monitor.

## 2023-05-29 NOTE — HOSPITAL COURSE
Patient admitted to Hospital Medicine for evaluation of syncopal episode.  Reportedly, 2nd syncopal episode in the setting of straining while trying to have bowel movement.  Patient with chronic constipation due to opioid use; not on a bowel regimen at home.  Vasovagal syncope most likely.   without overt signs or symptoms of volume overload, troponin 0.012.  UA non infectious, EKG and telemetry while admitted unremarkable for arrhythmia.  TSH within normal limits.  Carotid Doppler ultrasound without significant stenosis.  Echo with low normal systolic function and indeterminate left ventricular diastolic function.  Orthostatics not significant for orthostatic hypotension.  Patient without further hospital needs.  Bowel regimen prescribed.  Return precautions advised.  Patient with standing Oncology appointment 5/30.  Ambulatory referral placed to cardiology given Echo with low-normal systolic function; defer GDMT titration to ambulatory setting as concern for ability to tolerate especially considering ongoing therapy for lung cancer - was initiated on low-dose lisinopril this admission, however.  Patient and granddaughter in agreement with discharge plan.    Vitals:    05/29/23 0743 05/29/23 1131 05/29/23 1207 05/29/23 1339   BP: (!) 144/64  138/76    BP Location: Right arm  Right arm    Patient Position: Lying  Lying    Pulse: 72 81 77 72   Resp: 18  18 18   Temp: 97.5 °F (36.4 °C)  97.6 °F (36.4 °C)    TempSrc: Oral  Oral    SpO2: (!) 94%  95% 95%   Weight:       Height:         Physical Exam  Vitals and nursing note reviewed.   Constitutional:       General: She is not in acute distress.     Appearance: She is obese. She is not toxic-appearing or diaphoretic.      Comments: Chronically ill appearing   HENT:      Head: Normocephalic and atraumatic.      Nose: Nose normal.      Mouth/Throat:      Mouth: Mucous membranes are moist.   Eyes:      Pupils: Pupils are equal, round, and reactive to light.    Cardiovascular:      Rate and Rhythm: Normal rate and regular rhythm.      Pulses: Normal pulses.   Pulmonary:      Effort: Pulmonary effort is normal. No respiratory distress.      Breath sounds: No wheezing, rhonchi or rales.      Comments: on room air  Abdominal:      General: Bowel sounds are normal. There is no distension.      Palpations: Abdomen is soft.      Tenderness: There is no abdominal tenderness. There is no guarding.   Skin:     General: Skin is warm and dry.      Capillary Refill: Capillary refill takes less than 2 seconds.   Neurological:      Mental Status: She is alert. Mental status is at baseline.      Comments: Oriented x2. At baseline per granddaughter. Following commands appropriately.

## 2023-05-29 NOTE — ED PROVIDER NOTES
"Encounter Date: 5/28/2023       History     Chief Complaint   Patient presents with    Loss of Consciousness     EMS reports that pt had syncopal episode on toilet today. Reports bloody stools w/ bright red blood. Aox2, oriented to person and place. Hx lung cx.      Majo Diego is a 72 y.o. female with PMH of COPD, lung cancer, [place] [facility] [place].  Patient accompanied by granddaughter who is her caregiver.  States that she was previously constipated and went to the bathroom to have a bowel movement.  Patient had episode of syncope with loss of consciousness for approximately 1 minute.  Patient did not hit her head or fall to the ground since patient's caregiver was holding her.  After initial bowel movement, patient had another episode of bright red blood mixed in the stool.  Patient was complaining of abdominal pain prior to bowel movement but is no longer endorsing abdominal pain.  Patient had 1 episode of emesis with EMS prior to arrival.    Review of patient's allergies indicates:  No Known Allergies  Past Medical History:   Diagnosis Date    AAA (abdominal aortic aneurysm)     Arthritis     Back pain     BMI 45.0-49.9, adult 12/17/2019    Cancer     CKD (chronic kidney disease)     COPD (chronic obstructive pulmonary disease)     CVA (cerebral vascular accident)     right sided weakness    Diabetes mellitus     Gallbladder & bile duct stone with obstruction     Heart defect     "hole in heart"    Hypertension     Obesity hypoventilation syndrome 12/17/2019    Restrictive lung disease     Steal syndrome as complication of dialysis access 12/17/2019     Past Surgical History:   Procedure Laterality Date    AV FISTULA PLACEMENT Left 08/05/2019    Procedure: CREATION, AV FISTULA;  Surgeon: Sebastian Degroot MD;  Location: [facility];  Service: Cardiovascular;  Laterality: Left;    BONE RESECTION, RIB      2 on back shoulder removed    CHOLECYSTECTOMY      HYSTERECTOMY      INSERTION OF TUNNELED " CENTRAL VENOUS CATHETER (CVC) WITH SUBCUTANEOUS PORT Right 3/15/2023    Procedure: [id number];  Surgeon: Rod Cisse MD;  Location: [facility] OR;  Service: General;  Laterality: Right;  right IJ port a cath    LIGATION OF ARTERIOVENOUS FISTULA Left 10/28/2019    Procedure: LIGATION, AV FISTULA;  Surgeon: Sebastian Degroot MD;  Location: [facility];  Service: Cardiovascular;  Laterality: Left;    LUNG BIOPSY Right 2023    OOPHORECTOMY       Family History   Problem Relation Age of Onset    Cancer Father      Social History     Tobacco Use    Smoking status: Former     Packs/day: 2.00     Years: 56.00     Pack years: 112.00     Types: Cigarettes     Start date: 1/10/1967     Quit date: 1/10/2017     Years since quittin.3    Smokeless tobacco: Never   Substance Use Topics    Alcohol use: No    Drug use: No     Review of Systems   Constitutional:  Negative for chills and fever.   HENT:  Negative for congestion.    Eyes:  Negative for visual disturbance.   Respiratory:  Positive for shortness of breath.    Cardiovascular:  Negative for chest pain and leg swelling.   Gastrointestinal:  Positive for blood in stool, constipation, diarrhea and nausea. Negative for abdominal distention, abdominal pain and vomiting.   Endocrine: Negative for polyuria.   Genitourinary:  Negative for decreased urine volume and dysuria.     Physical Exam     Initial Vitals [23 1615]   BP Pulse Resp Temp SpO2   (!) 147/90 85 (!) 22 97 °F (36.1 °C) 98 %      MAP       --         Physical Exam    Nursing note and vitals reviewed.  Constitutional: Vital signs are normal. She is cooperative. She appears ill (Chronically ill-appearing.).   HENT:   Head: Normocephalic and atraumatic.   Eyes: Conjunctivae and EOM are normal. Pupils are equal, round, and reactive to light.   Neck: Trachea normal and phonation normal. Neck supple. No tracheal deviation present.   Cardiovascular:  Normal rate, regular rhythm, normal heart sounds,  intact distal pulses and normal pulses.     Exam reveals no gallop, no S3, no S4 and no friction rub.       No murmur heard.  Pulmonary/Chest: Breath sounds normal. No respiratory distress. She has no wheezes. She has no rhonchi. She has no rales.   Abdominal: Abdomen is soft. She exhibits no distension. There is no abdominal tenderness.   Musculoskeletal:      Cervical back: Neck supple.      Comments: Extremities atraumatic x4.  No clubbing, cyanosis, edema.     Neurological: She is alert. She has normal strength. No cranial nerve deficit or sensory deficit. She exhibits normal muscle tone. She displays a negative Romberg sign. Coordination normal. GCS score is 15. GCS eye subscore is 4. GCS verbal subscore is 5. GCS motor subscore is 6.   A&O x2 which is patient's baseline.  Somewhat limited neurologic exam, no gross neurologic deficits identified.   Skin: Skin is warm, dry and intact. Capillary refill takes less than 2 seconds.   Psychiatric: Her speech is normal.       ED Course   Procedures  Labs Reviewed   CBC W/ AUTO DIFFERENTIAL - Abnormal; Notable for the following components:       Result Value    RBC 3.67 (*)      (*)     MCH 33.0 (*)     RDW 20.7 (*)     Platelets 147 (*)     Gran # (ANC) 10.9 (*)     Immature Grans (Abs) 0.06 (*)     Lymph # 0.7 (*)     Gran % 88.6 (*)     Lymph % 5.8 (*)     Mono % 3.8 (*)     All other components within normal limits   COMPREHENSIVE METABOLIC PANEL - Abnormal; Notable for the following components:    Glucose 169 (*)     Albumin 3.4 (*)     eGFR 44.0 (*)     All other components within normal limits   URINALYSIS, REFLEX TO URINE CULTURE - Abnormal; Notable for the following components:    Appearance, UA Hazy (*)     Protein, UA 2+ (*)     Glucose, UA Trace (*)     Ketones, UA Trace (*)     Bilirubin (UA) 1+ (*)     All other components within normal limits    Narrative:     Specimen Source->Urine   B-TYPE NATRIURETIC PEPTIDE - Abnormal; Notable for the  following components:     (*)     All other components within normal limits   URINALYSIS MICROSCOPIC - Abnormal; Notable for the following components:    Hyaline Casts, UA 11 (*)     Granular Casts, UA 9 (*)     All other components within normal limits    Narrative:     Specimen Source->Urine   CLOSTRIDIUM DIFFICILE   TROPONIN I   MAGNESIUM   MAGNESIUM    Narrative:     ADD ON MAGNESIUM PER WICHO REBOLLAR NP/ORDER# [id number] @ 21:40     EKG Readings: (Independently Interpreted)   Initial Reading: No STEMI. Previous EKG: Compared with most recent EKG Rhythm: Normal Sinus Rhythm. Heart Rate: 73. Ectopy: No Ectopy. ST Segments: Normal ST Segments. T Waves Flipped: AVR and V1. Axis: Normal. Clinical Impression: Normal Sinus Rhythm     Imaging Results              CT Head Without Contrast (Final result)  Result time 05/28/23 18:14:48      Final result by Eugene Castaneda MD (05/28/23 18:14:48)                   Impression:      1.  No evidence of acute intracranial pathology.  No acute intracranial hemorrhage or significant new edema or mass effect.    2.  Chronic right parietal lobe infarct with encephalomalacia.    3.  Ill-defined region of hypoattenuation in the left sloane corresponding to presumed metastasis from prior MRI examinations.  The other small known intracranial enhancing lesions are inconspicuous on noncontrast CT and better demonstrated on the recent exam from 05/26/2023.    Electronically signed by resident: Christophe Alvarez  Date:    05/28/2023  Time:    18:02    Electronically signed by: Eugene Castaneda MD  Date:    05/28/2023  Time:    18:14               Narrative:    EXAMINATION:  CT HEAD WITHOUT CONTRAST    CLINICAL HISTORY:  syncope, lung CA;    TECHNIQUE:  Low dose axial CT images obtained throughout the head without the use of intravenous contrast.  Axial, sagittal and coronal reconstructions were performed.    COMPARISON:  MRI brain 05/26/2023 and CT head 04/21/2023.    FINDINGS:  Intracranial  compartment:    The ventricles are stable in size and configuration without evidence of hydrocephalus.    Stable moderate-sized region of encephalomalacia in the right parietal lobe distant with prior infarct.  No evidence acute major vascular territory infarct.  No new regions of significant edema, mass effect, or midline shift.  No acute intraparenchymal hemorrhage.    Small vague area of hypoattenuation in the left sloane corresponding to site of known enhancing intracranial metastasis from prior recent MRI 05/26/2023.  Scattered small foci of hypoattenuation elsewhere in the supratentorial white matter which are nonspecific but compatible with chronic microvascular ischemic changes.    No extra-axial blood or fluid collections.    Skull/extracranial contents (limited evaluation):    No fracture. Mastoid air cells and partially imaged paranasal sinuses are essentially clear.                                       X-Ray Chest AP Portable (Final result)  Result time 05/28/23 17:35:34      Final result by Alonzo Macdonald MD (05/28/23 17:35:34)                   Impression:      1. Interstitial findings may reflect edema, no large focal consolidation.      Electronically signed by: Alonzo Macdonald MD  Date:    05/28/2023  Time:    17:35               Narrative:    EXAMINATION:  XR CHEST AP PORTABLE    CLINICAL HISTORY:  tachypnea;    TECHNIQUE:  Single frontal view of the chest was performed.    COMPARISON:  04/21/2023    FINDINGS:  The cardiomediastinal silhouette is prominent, similar to the previous exam..  There is no pleural effusion.  The trachea is midline.  The lungs are symmetrically expanded bilaterally with coarse interstitial attenuation bilaterally.  There is bilateral basilar subsegmental atelectasis or scarring..  No large focal consolidation seen.  There is no pneumothorax.  The osseous structures are remarkable for degenerative changes..                                       Medications   allopurinoL  tablet 300 mg (has no administration in time range)   DULoxetine DR capsule 20 mg (has no administration in time range)   aspirin chewable tablet 81 mg (has no administration in time range)   clopidogreL tablet 75 mg (has no administration in time range)   atorvastatin tablet 80 mg (80 mg Oral Given 5/28/23 7180)   magnesium oxide tablet 400 mg (has no administration in time range)   HYDROcodone-acetaminophen 7.5-325 mg per tablet 1 tablet (has no administration in time range)   sodium chloride 0.9% flush 10 mL (has no administration in time range)   naloxone 0.4 mg/mL injection 0.02 mg (has no administration in time range)   glucagon (human recombinant) injection 1 mg (has no administration in time range)   dextrose 10% bolus 125 mL 125 mL (has no administration in time range)   dextrose 10% bolus 250 mL 250 mL (has no administration in time range)   dextrose 40 % gel 15,000 mg (has no administration in time range)   dextrose 40 % gel 30,000 mg (has no administration in time range)   acetaminophen tablet 650 mg (has no administration in time range)   insulin aspart U-100 pen 0-5 Units (has no administration in time range)   melatonin tablet 6 mg (has no administration in time range)   magnesium sulfate in dextrose IVPB (premix) 1 g (has no administration in time range)   senna-docusate 8.6-50 mg per tablet 1 tablet (has no administration in time range)   albuterol inhaler 2 puff (has no administration in time range)     Medical Decision Making:   Initial Assessment:   72-year-old female with lung cancer and multiple comorbidities presenting after a syncopal episode.  Initially, patient hypertensive but overall hemodynamically stable.  Patient back to her baseline upon arrival to the ED.  Differential Diagnosis:   vasovagal syncope, anemia, C diff colitis, constipation, GI bleed, electrolyte abnormality, CHF exacerbation/ACS, pneumonia  Clinical Tests:   Lab Tests: Ordered and Reviewed  The following lab test(s)  were unremarkable: CBC, CMP, Urinalysis, BNP and Troponin  Radiological Study: Ordered and Reviewed  Medical Tests: Reviewed and Ordered  ED Management:  Patient presents after syncopal episode with episode of bright red blood in the stool.  Patient's syncope is consistent with vasovagal syncope, will evaluate for other etiology such as electrolyte abnormality, lung/heart disease, pneumonia, arrhythmia.  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    Workup as detailed below in ED course.  Workup significant for mild elevation in BNP and borderline pulmonary edema on chest x-ray.  Will not diurese at this time since patient's presentation is not consistent with CHF exacerbation.    Discussed patient's case with Hospital Medicine who agreed to admit patient to their service for syncope workup and to trend troponins for possible GI bleed.  Patient remains hemodynamically stable and appropriate for transfer to the floor.             ED Course as of 05/28/23 2352   Sun May 28, 2023   1820 CBC auto differential(!)  Within normal limits. [ES]   1820 Comprehensive metabolic panel(!)  Within normal limits. [ES]   1821 CT Head Without Contrast  No acute ischemic stroke/hemorrhagic stroke.  Evidence of parietal lobe encephalomalacia.  Evidence of known metastasis. [ES]   1821 X-Ray Chest AP Portable  Consistent with pulmonary edema. [ES]   1821 BNP(!): 137 [ES]   1821 Troponin I: 0.012 [ES]      ED Course User Index  [ES] Charo Green MD                 Clinical Impression:   Final diagnoses:  [R06.82] Tachypnea  [K92.1] Hematochezia        ED Disposition Condition    Observation Stable                Charo Green MD  Resident  05/28/23 2352

## 2023-05-29 NOTE — CONSULTS
RD consult for altered skin integrity. Spoke to RN who states wound do not warrant Migue or nutrition intervention at this time.     Re-consult as needed    Thanks,    An Mcmahon, Registration Eligible, Provisional LDN

## 2023-05-29 NOTE — NURSING
IV x 1 removed.   Pt/Daughter given discharge instruction/teaching.   Opportunity given to ask questions.   All questions that were asked have been answered. Understanding has been verbalized.   Awaiting discharge prescriptions to be delivered from inpatient Pharmacy per bedside delivery.   Pt informed case mgmt will call with any follow up appt information.   Pt informed to keep all follow up appts.   No complaints upon discharge.   Pt escort will be requested to transport to the front entrance after receipt of discharge medications.

## 2023-05-29 NOTE — PROGRESS NOTES
"[facility]  Wound Care    Patient Name:  Majo Diego   MRN:  [id number]  Date: 2023  Diagnosis: Syncope    History:     Past Medical History:   Diagnosis Date    AAA (abdominal aortic aneurysm)     Arthritis     Back pain     BMI 45.0-49.9, adult 2019    Cancer     CKD (chronic kidney disease)     COPD (chronic obstructive pulmonary disease)     CVA (cerebral vascular accident)     right sided weakness    Diabetes mellitus     Gallbladder & bile duct stone with obstruction     Heart defect     "hole in heart"    Hypertension     Obesity hypoventilation syndrome 2019    Restrictive lung disease     Steal syndrome as complication of dialysis access 2019       Social History     Socioeconomic History    Marital status:    Tobacco Use    Smoking status: Former     Packs/day: 2.00     Years: 56.00     Pack years: 112.00     Types: Cigarettes     Start date: 1/10/1967     Quit date: 1/10/2017     Years since quittin.3    Smokeless tobacco: Never   Substance and Sexual Activity    Alcohol use: No    Drug use: No    Sexual activity: Not Currently     Social Determinants of Health     Financial Resource Strain: Low Risk     Difficulty of Paying Living Expenses: Not very hard   Transportation Needs: No Transportation Needs    Lack of Transportation (Medical): No    Lack of Transportation (Non-Medical): No   Physical Activity: Inactive    Days of Exercise per Week: 0 days    Minutes of Exercise per Session: 0 min   Housing Stability: Low Risk     Unable to Pay for Housing in the Last Year: No    Number of Places Lived in the Last Year: 1    Unstable Housing in the Last Year: No       Precautions:     Allergies as of 2023    (No Known Allergies)       WOC Assessment Details/Treatment   Patient seen for wound care consultation.   Reviewed chart for this encounter.   See Flow Sheet for findings.    Pt lyingin bed with family at the bedside. WOC Nurse not intact, " blanchable red skin to the buttocks area. WO Nurse to order waffle mattress. Upon entry bedside RN informed this nurse about redness under the breast. WOC Nurse cleansed breast with soap and water wipes before patting dry. WO Nurse to send antifungal up to the pt's room.     RECOMMENDATIONS  Recommendations made to primary team for above plan via secured chat. WOC to follow up Orders placed.     Discussed POC with patient and primary RN.   See EMR for orders & patient education.  Discussed POC with primary team.    Nursing to continue care.  Nursing to maintain pressure injury prevention interventions.  Contact wound care for any further questions.     05/29/23 1035   WOCN Assessment   WOCN Total Time (mins) 30   Visit Date 05/29/23   Visit Time 1035   Consult Type New   WOCN Speciality Wound   Intervention assessed;changed;applied;chart review;coordination of care;orders   Teaching on-going        Altered Skin Integrity 04/21/23 0135 Buttocks #2   Date First Assessed/Time First Assessed: 04/21/23 0135   Altered Skin Integrity Present on Admission - Did Patient arrive to the hospital with altered skin?: yes  Location: (c) Buttocks  Wound Number: #2   Wound Image    Dressing Appearance No dressing   Drainage Amount None   Appearance Pink;Red;Intact;Dry   Tissue loss description Not applicable   Care Cleansed with:;Soap and water         05/29/2023

## 2023-06-29 ENCOUNTER — EXTERNAL HOME HEALTH (OUTPATIENT)
Dept: HOME HEALTH SERVICES | Facility: HOSPITAL | Age: 73
End: 2023-06-29
Payer: MEDICAID

## 2023-08-07 PROBLEM — K92.2 GI BLEED: Status: RESOLVED | Noted: 2023-04-25 | Resolved: 2023-08-07

## 2023-10-04 ENCOUNTER — PATIENT OUTREACH (OUTPATIENT)
Dept: ADMINISTRATIVE | Facility: HOSPITAL | Age: 73
End: 2023-10-04

## 2023-10-04 DIAGNOSIS — E11.22 TYPE 2 DIABETES MELLITUS WITH STAGE 3B CHRONIC KIDNEY DISEASE, WITHOUT LONG-TERM CURRENT USE OF INSULIN: Primary | ICD-10-CM

## 2023-10-04 DIAGNOSIS — N18.32 TYPE 2 DIABETES MELLITUS WITH STAGE 3B CHRONIC KIDNEY DISEASE, WITHOUT LONG-TERM CURRENT USE OF INSULIN: Primary | ICD-10-CM

## 2023-11-29 DIAGNOSIS — Z12.31 OTHER SCREENING MAMMOGRAM: ICD-10-CM
